# Patient Record
Sex: FEMALE | Race: WHITE | NOT HISPANIC OR LATINO
[De-identification: names, ages, dates, MRNs, and addresses within clinical notes are randomized per-mention and may not be internally consistent; named-entity substitution may affect disease eponyms.]

---

## 2017-01-23 ENCOUNTER — APPOINTMENT (OUTPATIENT)
Dept: ENDOCRINOLOGY | Facility: CLINIC | Age: 57
End: 2017-01-23

## 2019-01-18 ENCOUNTER — INPATIENT (INPATIENT)
Facility: HOSPITAL | Age: 59
LOS: 0 days | Discharge: HOME | DRG: 313 | End: 2019-01-19
Attending: HOSPITALIST | Admitting: HOSPITALIST
Payer: COMMERCIAL

## 2019-01-18 VITALS
HEIGHT: 62 IN | TEMPERATURE: 98 F | HEART RATE: 82 BPM | WEIGHT: 141.98 LBS | SYSTOLIC BLOOD PRESSURE: 131 MMHG | OXYGEN SATURATION: 100 % | RESPIRATION RATE: 18 BRPM | DIASTOLIC BLOOD PRESSURE: 78 MMHG

## 2019-01-18 DIAGNOSIS — R07.9 CHEST PAIN, UNSPECIFIED: ICD-10-CM

## 2019-01-18 LAB
ALBUMIN SERPL ELPH-MCNC: 4.4 G/DL — SIGNIFICANT CHANGE UP (ref 3.3–5)
ALP SERPL-CCNC: 78 U/L — SIGNIFICANT CHANGE UP (ref 40–120)
ALT FLD-CCNC: 22 U/L — SIGNIFICANT CHANGE UP (ref 10–45)
ANION GAP SERPL CALC-SCNC: 13 MMOL/L — SIGNIFICANT CHANGE UP (ref 5–17)
APTT BLD: 23.6 SEC — LOW (ref 27.5–36.3)
AST SERPL-CCNC: 25 U/L — SIGNIFICANT CHANGE UP (ref 10–40)
BASOPHILS # BLD AUTO: 0.1 K/UL — SIGNIFICANT CHANGE UP (ref 0–0.2)
BASOPHILS NFR BLD AUTO: 0.7 % — SIGNIFICANT CHANGE UP (ref 0–2)
BILIRUB SERPL-MCNC: 0.2 MG/DL — SIGNIFICANT CHANGE UP (ref 0.2–1.2)
BUN SERPL-MCNC: 21 MG/DL — SIGNIFICANT CHANGE UP (ref 7–23)
CALCIUM SERPL-MCNC: 9.6 MG/DL — SIGNIFICANT CHANGE UP (ref 8.4–10.5)
CHLORIDE SERPL-SCNC: 104 MMOL/L — SIGNIFICANT CHANGE UP (ref 96–108)
CO2 SERPL-SCNC: 24 MMOL/L — SIGNIFICANT CHANGE UP (ref 22–31)
CREAT SERPL-MCNC: 0.98 MG/DL — SIGNIFICANT CHANGE UP (ref 0.5–1.3)
EOSINOPHIL # BLD AUTO: 0.2 K/UL — SIGNIFICANT CHANGE UP (ref 0–0.5)
EOSINOPHIL NFR BLD AUTO: 2.3 % — SIGNIFICANT CHANGE UP (ref 0–6)
GLUCOSE SERPL-MCNC: 98 MG/DL — SIGNIFICANT CHANGE UP (ref 70–99)
HCT VFR BLD CALC: 39.4 % — SIGNIFICANT CHANGE UP (ref 34.5–45)
HGB BLD-MCNC: 13.3 G/DL — SIGNIFICANT CHANGE UP (ref 11.5–15.5)
INR BLD: 0.98 RATIO — SIGNIFICANT CHANGE UP (ref 0.88–1.16)
LYMPHOCYTES # BLD AUTO: 3.4 K/UL — HIGH (ref 1–3.3)
LYMPHOCYTES # BLD AUTO: 34.1 % — SIGNIFICANT CHANGE UP (ref 13–44)
MCHC RBC-ENTMCNC: 29.8 PG — SIGNIFICANT CHANGE UP (ref 27–34)
MCHC RBC-ENTMCNC: 33.7 GM/DL — SIGNIFICANT CHANGE UP (ref 32–36)
MCV RBC AUTO: 88.4 FL — SIGNIFICANT CHANGE UP (ref 80–100)
MONOCYTES # BLD AUTO: 0.8 K/UL — SIGNIFICANT CHANGE UP (ref 0–0.9)
MONOCYTES NFR BLD AUTO: 7.7 % — SIGNIFICANT CHANGE UP (ref 2–14)
NEUTROPHILS # BLD AUTO: 5.5 K/UL — SIGNIFICANT CHANGE UP (ref 1.8–7.4)
NEUTROPHILS NFR BLD AUTO: 55.2 % — SIGNIFICANT CHANGE UP (ref 43–77)
PLATELET # BLD AUTO: 326 K/UL — SIGNIFICANT CHANGE UP (ref 150–400)
POTASSIUM SERPL-MCNC: 4.7 MMOL/L — SIGNIFICANT CHANGE UP (ref 3.5–5.3)
POTASSIUM SERPL-SCNC: 4.7 MMOL/L — SIGNIFICANT CHANGE UP (ref 3.5–5.3)
PROT SERPL-MCNC: 7.2 G/DL — SIGNIFICANT CHANGE UP (ref 6–8.3)
PROTHROM AB SERPL-ACNC: 11.3 SEC — SIGNIFICANT CHANGE UP (ref 10–12.9)
RBC # BLD: 4.45 M/UL — SIGNIFICANT CHANGE UP (ref 3.8–5.2)
RBC # FLD: 12.1 % — SIGNIFICANT CHANGE UP (ref 10.3–14.5)
SODIUM SERPL-SCNC: 141 MMOL/L — SIGNIFICANT CHANGE UP (ref 135–145)
TROPONIN T, HIGH SENSITIVITY RESULT: <6 NG/L — SIGNIFICANT CHANGE UP (ref 0–51)
TROPONIN T, HIGH SENSITIVITY RESULT: <6 NG/L — SIGNIFICANT CHANGE UP (ref 0–51)
WBC # BLD: 9.9 K/UL — SIGNIFICANT CHANGE UP (ref 3.8–10.5)
WBC # FLD AUTO: 9.9 K/UL — SIGNIFICANT CHANGE UP (ref 3.8–10.5)

## 2019-01-18 PROCEDURE — 93010 ELECTROCARDIOGRAM REPORT: CPT

## 2019-01-18 PROCEDURE — 99223 1ST HOSP IP/OBS HIGH 75: CPT

## 2019-01-18 PROCEDURE — 71046 X-RAY EXAM CHEST 2 VIEWS: CPT | Mod: 26

## 2019-01-18 PROCEDURE — 99284 EMERGENCY DEPT VISIT MOD MDM: CPT | Mod: 25

## 2019-01-18 RX ORDER — ASPIRIN/CALCIUM CARB/MAGNESIUM 324 MG
324 TABLET ORAL DAILY
Qty: 0 | Refills: 0 | Status: DISCONTINUED | OUTPATIENT
Start: 2019-01-18 | End: 2019-01-19

## 2019-01-18 NOTE — H&P ADULT - NSHPPHYSICALEXAM_GEN_ALL_CORE
Vital Signs Last 24 Hrs  T(C): 36.7 (18 Jan 2019 19:41), Max: 36.7 (18 Jan 2019 19:41)  T(F): 98 (18 Jan 2019 19:41), Max: 98 (18 Jan 2019 19:41)  HR: 82 (18 Jan 2019 19:41) (82 - 82)  BP: 131/78 (18 Jan 2019 19:41) (131/78 - 131/78)  BP(mean): --  RR: 18 (18 Jan 2019 19:41) (18 - 18)  SpO2: 100% (18 Jan 2019 19:41) (100% - 100%)    PHYSICAL EXAM:  GENERAL: NAD, well-groomed, well-developed  HEAD:  Atraumatic, Normocephalic, maxillary sinus tenderness to palpation   EYES: EOMI, PERRLA, conjunctiva and sclera clear  ENMT: No oropharyngeal exudates, erythema or lesions,  Moist mucous membranes, good dentition  NECK: Supple, no cervical lymphadenopathy, + JVD to angle of jaw  NERVOUS SYSTEM:  Alert & Oriented X3, CN II-XII intact, 5/5 BUE and BLE motor strength, full sensation to light touch   CHEST/LUNG: Clear to auscultation bilaterally; No rales, no  rhonchi, no wheezing  HEART: Regular rate and rhythm; No murmurs, rubs, or gallops  ABDOMEN: Soft, Nontender, Nondistended  EXTREMITIES:  2+ Peripheral Pulses, No clubbing, cyanosis, or edema  LYMPH: No lymphadenopathy noted  SKIN: No rashes or lesions

## 2019-01-18 NOTE — ED ADULT NURSE NOTE - OBJECTIVE STATEMENT
58 yr old female with no medical history came in with congestion and pressure with some palpitations today. on assessment a and o x 3 lungs clear abd soft non tender no swelling in extremities, no n/v/d no fevers. pt states she feels that her chest is heavy and feels like she cannot take a deep enough breath.

## 2019-01-18 NOTE — H&P ADULT - PROBLEM SELECTOR PLAN 1
Differential for chest pressure includes ACS vs CAD vs viral URI  Patient does not have history of htn, hLd, CKD, DM or other common risk factors for CAD, however she does describe classic chest pain and dyspnea concerning for potential ACS. In addition, she has elevated JVD on exam.   Patient endorses chest pressure and had negative troponin x 1.   Will repeat troponin, check A1C and lipid profile.  Check proBNP.   Will monitor on telemetry.  Obtain TTE to assess for structural heart disease.  Patient states she is unable to ambulate comfortably due to dyspnea and thus is unlikely able to tolerate an exercise stress test. Differential for chest pressure includes ACS vs CAD vs viral URI  Patient does not have history of htn, hLd, CKD, DM or other common risk factors for CAD, however she does describe classic chest pain and dyspnea concerning for potential ACS. In addition, she has elevated JVD on exam.   Patient endorses chest pressure and had negative troponin x 1.   Will repeat troponin, check A1C and lipid profile.  Check proBNP.   Will monitor on telemetry.  Obtain TTE to assess for structural heart disease.  Patient states she is unable to ambulate comfortably due to dyspnea and thus is unlikely able to tolerate an exercise stress test.  She has no tachycardia, hypoxia, tachypnea to suggest pulmonary embolism.

## 2019-01-18 NOTE — H&P ADULT - NSHPREVIEWOFSYSTEMS_GEN_ALL_CORE
REVIEW OF SYSTEMS  CONSTITUTIONAL: No fever, + chills, no fatigue  EYES: No eye pain, no vision changes  ENMT:  No difficulty hearing, no throat pain, +maxillary pressure  RESPIRATORY: + Minimal cough, no wheezing, no sputum production;  +shortness of breath  CARDIOVASCULAR: + chest pressure, + palpitations, + SCHMITT, no leg swelling  GASTROINTESTINAL: No abdominal pain, no nausea, no vomiting, no hematemesis, no diarrhea, no constipation  GENITOURINARY: No dysuria, no hematuria  NEUROLOGICAL: No headaches, no loss of strength, no numbness  SKIN: No itching, no rashes, no lesions   MUSCULOSKELETAL: No joint pain, no joint swelling; No muscle pain  HEME/LYMPH: No easy bruising, bleeding

## 2019-01-18 NOTE — H&P ADULT - NSHPSOCIALHISTORY_GEN_ALL_CORE
The patient lives with her  and son.  She used to smoke for 5 years (socially, pack over 3 days), and quit about 20 years ago.

## 2019-01-18 NOTE — ED PROVIDER NOTE - CARE PLAN
Principal Discharge DX:	Chest pain Principal Discharge DX:	Chest pain  Secondary Diagnosis:	Former smoker, stopped smoking in distant past  Secondary Diagnosis:	Chest pressure  Secondary Diagnosis:	Shortness of breath

## 2019-01-18 NOTE — ED PROVIDER NOTE - OBJECTIVE STATEMENT
59 y/o female former smoker with no other PMHx sent in by Urgent Care for constant chest pressure x2 days. Patient stated the pain feels like "someone sitting on [her] chest" with associated SOB, palpitations, and dizziness while at work. Patient stated she also feels like "its squeezing" occasionally. Patient had recent sinus infection which patient felt like her symptoms were secondary to pneumonia. Patient denied fever chills, headache, back pain, abdominal pain, N/V/D, SCHMITT, peripheral edema, orthopnea, PND. Patient has no primary relatives with MI <61 y/o and has never had stress test.

## 2019-01-18 NOTE — ED PROVIDER NOTE - ATTENDING CONTRIBUTION TO CARE
Attending MD Sandy.  Agree with above.  Pt is a 59 yo female with pmhx of tobacco use >20 yrs ago, no family hx of cardiac disease/sudden premature death from cardiac cause or unknown cause who presents to ED with complaint of chest pressure ‘like something’ is sitting on her chest, SOB and palpitations x several days.  She had a ‘sinus infection’ last week and attributed her sxs to this believing she might have PNA.  Denies sig cough.  Denies pleuritic nature to CP.  Pt is not in resp distress in ED.  Pt is not tachycardic.  Has no LE edema, no hormone supplements on board.  No hx of DVT/PE.  No identifiable PE risk factors including no recent travel/prolonged bed rest.  She is well appearing.  EKG c/w T wave inversions in V1V2 without reciprocal changes. Attending MD Sandy.  Agree with above.  Pt is a 59 yo female with pmhx of tobacco use >20 yrs ago, no family hx of cardiac disease/sudden premature death from cardiac cause or unknown cause who presents to ED with complaint of chest pressure ‘like something’ is sitting on her chest, SOB and palpitations x several days.  She had a ‘sinus infection’ last week and attributed her sxs to this believing she might have PNA.  Denies sig cough.  Denies pleuritic nature to CP.  Pt is not in resp distress in ED.  Pt is not tachycardic.  Has no LE edema, no hormone supplements on board.  No hx of DVT/PE.  No identifiable PE risk factors including no recent travel/prolonged bed rest.  She is well appearing.  EKG c/w T wave inversions in V1V2 without reciprocal changes.    Concerning HPI for chest pressure assoc with SOB, age >55, female gender, T wave inversions in V1, V2 with no old EKG for comparison.  Pt stable for admission for ACS eval.  Pt amenable to plan.

## 2019-01-18 NOTE — ED PROVIDER NOTE - CROS ED MUSC ALL NEG
Report from Roula Hanson CRNA, see anesthesia record. ABD remains soft and non-tender post procedure. Pt has no complaints at this time and tolerated the procedure well. negative...

## 2019-01-18 NOTE — H&P ADULT - PROBLEM SELECTOR PLAN 2
Patient endorses sinus congestion and has had tenderness on exam.  Due to persistent symptoms over last 2 weeks, will start short course of amoxicillin-clavulanate 875mg/125mg BID for suspected bacterial sinusitis.  Start ocean nasal spray.   Monitor for resolution of symptoms

## 2019-01-18 NOTE — H&P ADULT - HISTORY OF PRESENT ILLNESS
This patient is a 58yoF with PMH of smoking who presents to ED with complaint of chest pain.    In the ED T 98F, HR 82, /78, RR 18, SpO2 100%RA     CBC is unremarkable. CMP notable for SCr of 0.98, with estimated GFR of 64. Troponin T < 6.     xray- has bronchiolitis This patient is a 58yoF with PMH of smoking who presents to ED with complaint of chest pressure. She endorses having significant sinus congestion, with minimal cough and white sputum production. She visited urgent care and was prescribed amoxicillin and nasal spray. The patient has had persistent maxillary pressure, but also developed new onset chest pressure and tightness, which she describes as "having a 2-3lb necklace on." The patient states that she has felt SOB while walking around her home and dizziness. She denies any orthopnea or PND. She also denies nausea, vomiting, abdominal pain. She has had chills. She denies hx of CAD or any history of stress test. She had new onset of palpitations today which prompted her to visit urgent care, which had sent her to the hospital. Her family history is notable for a father who has atrial fibrillation and mitral valve replacement.     In the ED T 98F, HR 82, /78, RR 18, SpO2 100%RA   She was given aspirin 325mg

## 2019-01-18 NOTE — H&P ADULT - NSHPLABSRESULTS_GEN_ALL_CORE
Labs, imaging and EKG personally reviewed by me.     LABS:                        13.3   9.9   )-----------( 326      ( 18 Jan 2019 20:26 )             39.4     Hgb Trend: 13.3<--  01-18    141  |  104  |  21  ----------------------------<  98  4.7   |  24  |  0.98    Ca    9.6      18 Jan 2019 20:26    TPro  7.2  /  Alb  4.4  /  TBili  0.2  /  DBili  x   /  AST  25  /  ALT  22  /  AlkPhos  78  01-18    Creatinine Trend: 0.98<--  PT/INR - ( 18 Jan 2019 20:26 )   PT: 11.3 sec;   INR: 0.98 ratio       PTT - ( 18 Jan 2019 20:26 )  PTT:23.6 sec      EKG NSR HR 60s-70s, LAD, RBBB      < from: Xray Chest 2 Views PA/Lat (01.18.19 @ 20:50) >    EXAM:  XR CHEST PA LAT 2V                        PROCEDURE DATE:  01/18/2019      ******PRELIMINARY REPORT******      INTERPRETATION:  no emergent findings    < end of copied text >

## 2019-01-18 NOTE — H&P ADULT - FAMILY HISTORY
Father  Still living? Unknown  Family history of atrial fibrillation, Age at diagnosis: Age Unknown  Family history of mitral valve disorder, Age at diagnosis: Age Unknown

## 2019-01-18 NOTE — ED ADULT NURSE NOTE - NSIMPLEMENTINTERV_GEN_ALL_ED
Implemented All Universal Safety Interventions:  [place] to call system. Call bell, personal items and telephone within reach. Instruct patient to call for assistance. Room bathroom lighting operational. Non-slip footwear when patient is off stretcher. Physically safe environment: no spills, clutter or unnecessary equipment. Stretcher in lowest position, wheels locked, appropriate side rails in place.

## 2019-01-18 NOTE — H&P ADULT - ASSESSMENT
This patient is a 58yoF with PMH of smoking who presents to ED with complaint of chest pressure concerning for unstable angina.

## 2019-01-19 ENCOUNTER — TRANSCRIPTION ENCOUNTER (OUTPATIENT)
Age: 59
End: 2019-01-19

## 2019-01-19 VITALS
HEART RATE: 62 BPM | RESPIRATION RATE: 18 BRPM | TEMPERATURE: 98 F | SYSTOLIC BLOOD PRESSURE: 97 MMHG | DIASTOLIC BLOOD PRESSURE: 59 MMHG | OXYGEN SATURATION: 97 %

## 2019-01-19 DIAGNOSIS — R07.89 OTHER CHEST PAIN: ICD-10-CM

## 2019-01-19 DIAGNOSIS — Z29.9 ENCOUNTER FOR PROPHYLACTIC MEASURES, UNSPECIFIED: ICD-10-CM

## 2019-01-19 DIAGNOSIS — J34.89 OTHER SPECIFIED DISORDERS OF NOSE AND NASAL SINUSES: ICD-10-CM

## 2019-01-19 LAB
CHOLEST SERPL-MCNC: 209 MG/DL — HIGH (ref 10–199)
HBA1C BLD-MCNC: 4.4 % — SIGNIFICANT CHANGE UP (ref 4–5.6)
HDLC SERPL-MCNC: 86 MG/DL — SIGNIFICANT CHANGE UP
LIPID PNL WITH DIRECT LDL SERPL: 112 MG/DL — SIGNIFICANT CHANGE UP
NT-PROBNP SERPL-SCNC: 27 PG/ML — SIGNIFICANT CHANGE UP (ref 0–300)
TOTAL CHOLESTEROL/HDL RATIO MEASUREMENT: 2.4 RATIO — LOW (ref 3.3–7.1)
TRIGL SERPL-MCNC: 55 MG/DL — SIGNIFICANT CHANGE UP (ref 10–149)
TROPONIN T, HIGH SENSITIVITY RESULT: <6 NG/L — SIGNIFICANT CHANGE UP (ref 0–51)
TSH SERPL-MCNC: 1.3 UIU/ML — SIGNIFICANT CHANGE UP (ref 0.27–4.2)

## 2019-01-19 PROCEDURE — 83880 ASSAY OF NATRIURETIC PEPTIDE: CPT

## 2019-01-19 PROCEDURE — 80061 LIPID PANEL: CPT

## 2019-01-19 PROCEDURE — 71046 X-RAY EXAM CHEST 2 VIEWS: CPT

## 2019-01-19 PROCEDURE — 85027 COMPLETE CBC AUTOMATED: CPT

## 2019-01-19 PROCEDURE — 99285 EMERGENCY DEPT VISIT HI MDM: CPT

## 2019-01-19 PROCEDURE — 93306 TTE W/DOPPLER COMPLETE: CPT | Mod: 26

## 2019-01-19 PROCEDURE — 93306 TTE W/DOPPLER COMPLETE: CPT

## 2019-01-19 PROCEDURE — 93005 ELECTROCARDIOGRAM TRACING: CPT

## 2019-01-19 PROCEDURE — 80053 COMPREHEN METABOLIC PANEL: CPT

## 2019-01-19 PROCEDURE — 83036 HEMOGLOBIN GLYCOSYLATED A1C: CPT

## 2019-01-19 PROCEDURE — 99239 HOSP IP/OBS DSCHRG MGMT >30: CPT

## 2019-01-19 PROCEDURE — 84443 ASSAY THYROID STIM HORMONE: CPT

## 2019-01-19 PROCEDURE — 84484 ASSAY OF TROPONIN QUANT: CPT

## 2019-01-19 PROCEDURE — 85730 THROMBOPLASTIN TIME PARTIAL: CPT

## 2019-01-19 PROCEDURE — 85610 PROTHROMBIN TIME: CPT

## 2019-01-19 RX ORDER — SODIUM CHLORIDE 0.65 %
1 AEROSOL, SPRAY (ML) NASAL THREE TIMES A DAY
Qty: 0 | Refills: 0 | Status: DISCONTINUED | OUTPATIENT
Start: 2019-01-19 | End: 2019-01-19

## 2019-01-19 RX ORDER — ENOXAPARIN SODIUM 100 MG/ML
40 INJECTION SUBCUTANEOUS EVERY 24 HOURS
Qty: 0 | Refills: 0 | Status: DISCONTINUED | OUTPATIENT
Start: 2019-01-19 | End: 2019-01-19

## 2019-01-19 RX ORDER — ALBUTEROL 90 UG/1
2 AEROSOL, METERED ORAL
Qty: 1 | Refills: 0 | OUTPATIENT
Start: 2019-01-19 | End: 2019-02-17

## 2019-01-19 RX ORDER — PSEUDOEPHEDRINE HCL 30 MG
1 TABLET ORAL
Qty: 0 | Refills: 0 | COMMUNITY

## 2019-01-19 RX ORDER — ACETAMINOPHEN 500 MG
650 TABLET ORAL ONCE
Qty: 0 | Refills: 0 | Status: COMPLETED | OUTPATIENT
Start: 2019-01-19 | End: 2019-01-19

## 2019-01-19 RX ORDER — ASPIRIN/CALCIUM CARB/MAGNESIUM 324 MG
81 TABLET ORAL DAILY
Qty: 0 | Refills: 0 | Status: DISCONTINUED | OUTPATIENT
Start: 2019-01-19 | End: 2019-01-19

## 2019-01-19 RX ORDER — IPRATROPIUM/ALBUTEROL SULFATE 18-103MCG
3 AEROSOL WITH ADAPTER (GRAM) INHALATION ONCE
Qty: 0 | Refills: 0 | Status: DISCONTINUED | OUTPATIENT
Start: 2019-01-19 | End: 2019-01-19

## 2019-01-19 RX ADMIN — Medication 650 MILLIGRAM(S): at 09:19

## 2019-01-19 RX ADMIN — Medication 1 SPRAY(S): at 13:23

## 2019-01-19 RX ADMIN — Medication 650 MILLIGRAM(S): at 09:45

## 2019-01-19 RX ADMIN — Medication 1 SPRAY(S): at 05:20

## 2019-01-19 RX ADMIN — Medication 81 MILLIGRAM(S): at 12:15

## 2019-01-19 NOTE — DISCHARGE NOTE ADULT - CARE PLAN
Principal Discharge DX:	Chest pressure  Goal:	resolved  Assessment and plan of treatment:	troponinx3 negative  no events on telemetry  Secondary Diagnosis:	Sinusitis  Assessment and plan of treatment:	complete course of antibiotics  albuterol prn  sudafed prn

## 2019-01-19 NOTE — DISCHARGE NOTE ADULT - PLAN OF CARE
resolved troponinx3 negative  no events on telemetry complete course of antibiotics  albuterol prn  sudafed prn

## 2019-01-19 NOTE — PROVIDER CONTACT NOTE (MEDICATION) - ASSESSMENT
pt refusing augmentin & lovenox. pt states she was ordered augmentin outpatient for some cold-like symptoms like runny nose. she never took the medication and felt better & would not like to start the medication now

## 2019-01-19 NOTE — DISCHARGE NOTE ADULT - HOSPITAL COURSE
As per H&P-- 58yoF with PMH of smoking who presents to ED with complaint of chest pressure. She endorses having significant sinus congestion, with minimal cough and white sputum production. She visited urgent care and was prescribed amoxicillin and nasal spray. The patient has had persistent maxillary pressure, but also developed new onset chest pressure and tightness, which she describes as "having a 2-3lb necklace on." The patient states that she has felt SOB while walking around her home and dizziness. She denies any orthopnea or PND. She also denies nausea, vomiting, abdominal pain. She has had chills. She denies hx of CAD or any history of stress test. She had new onset of palpitations today which prompted her to visit urgent care, which had sent her to the hospital. Her family history is notable for a father who has atrial fibrillation and mitral valve replacement.     In the ED T 98F, HR 82, /78, RR 18, SpO2 100%RA   She was given aspirin 325mg    A/W chest pressure concerning for unstable angina.   Likely ACS vs CAD vs viral URI   troponin x 3 neg.  proBNP neg  no events on telemetry.  NO C/O of tachycardia, hypoxia, tachypnea to suggest pulmonary embolism.Pt c/o Sinus pressure and sinus congestion  Due to persistent symptoms over last 2 weeks, will continue short course of amoxicillin-clavulanate 875mg/125mg BID for suspected bacterial sinusitis. Vital signs are stable. Pt is seen by medicine attending today, cleared for discharge home

## 2019-01-19 NOTE — DISCHARGE NOTE ADULT - PATIENT PORTAL LINK FT
You can access the [facility] Patient Portal, offered by , by registering with the following website: [email or web address]

## 2019-01-19 NOTE — DISCHARGE NOTE ADULT - MEDICATION SUMMARY - MEDICATIONS TO TAKE
I will START or STAY ON the medications listed below when I get home from the hospital:    albuterol 90 mcg/inh inhalation aerosol  -- 2 puff(s) inhaled 4 times a day MDD:8 puffs prn  -- For inhalation only.  It is very important that you take or use this exactly as directed.  Do not skip doses or discontinue unless directed by your doctor.  Obtain medical advice before taking any non-prescription drugs as some may affect the action of this medication.  Shake well before use.    -- Indication: For Sinus pressure    Sudafed 12-Hour 120 mg oral tablet, extended release  -- 1 tab(s) by mouth every 12 hours prn for cold and congestion  -- Indication: For Congestion    amoxicillin-clavulanate 875 mg-125 mg oral tablet  -- 1 tab(s) by mouth 2 times a day to complete the course as prescribed  -- Indication: For Sinusitis

## 2019-01-24 PROBLEM — Z87.891 PERSONAL HISTORY OF NICOTINE DEPENDENCE: Chronic | Status: ACTIVE | Noted: 2019-01-18

## 2019-02-19 NOTE — ED PROVIDER NOTE - CARDIAC, MLM
Normal rate, regular rhythm.  Heart sounds S1, S2.  No murmurs, rubs or gallops. negative detailed exam

## 2019-03-11 ENCOUNTER — APPOINTMENT (OUTPATIENT)
Dept: CARDIOLOGY | Facility: CLINIC | Age: 59
End: 2019-03-11

## 2019-03-25 NOTE — DISCHARGE NOTE ADULT - FUNCTIONAL SCREEN CURRENT LEVEL: AMBULATION, MLM
PHYSICIAN NEXT STEPS:  Call the Patient    CHIEF COMPLAINT:  Chief Complaint/Protocol Used: Cuts And Lacerations  Onset: 1 week ago      ASSESSMENT:  ? Onset: 1 week ago  ? Appearance Of Injury: 2-3 inches, deep  ? Size: 2-3 inches  ? Bleeding: no  ? Location: left foot on top  ? When: unsure  ? Mechanism: unsure  ? Tetanus: 2011  -------------------------------------------------------    DISPOSITION:  Disposition Recommendation: Go to ED Now  Questions that led to disposition:  ? Skin is split open or gaping (if unsure, refer in if cut length > 1/4 inch or 6 mm on the face; length > 1/2 inch or 12 mm elsewhere)  Patient Directed To: Unspecified  Patient Intended Action: Go to Urgent Care Center      CALL NOTES:  03/24/2019 at 9:01 PM by Shirin Marks  ? Mother declined ED dispostion and is going to [facility]. Dr. Scales paged at 901pm    ? foot pain, limping,could barely walk, on top of foot big open wound deep,     DISPOSITION OVERRIDE/PROVIDER CONSULT:  Disposition Override: N/A  Override Source: Unspecified  Consulted with PCP: No  Consulted with On-Call Physician: No  Second Level Triage Override Reason: Sent to ED to avoid delay of care      CALLER CONTACT INFO:  Name: Jayleen Ballesteros (Self)  Phone 1: [phone] (Work Phone)  Phone 2: [phone] (Mobile) - Preferred  Phone 3: [phone] (Work Phone)      ENCOUNTER STARTED:  03/24/19 08:51:06 PM  ENCOUNTER ASSIGNED TO/CLOSED BY:  Shirin Marks @ 03/24/19 09:01:25 PM      -------------------------------------------------------    CARE ADVICE given per Cuts And Lacerations guideline.  GO TO ED NOW: Your child needs to be seen in the Emergency Department immediately. Go to the ER at _________[facility] Hospital. Leave now. Drive carefully.; CLEAN THE LACERATION:   * Wash the wound briefly with soap and water before being seen.  * Caution: Never soak a wound that might need sutures, because it may become more swollen and difficult to close.  * For any dirt, scrub  gently with a washcloth.  * For any bleeding, apply direct pressure with a sterile gauze for 10 minutes.  * Cover with a sterile gauze or Band-Aid until seen.; PAIN MEDICINE:   * For pain relief, give acetaminophen every 4 hours OR ibuprofen every 6 hours as needed. (See Dosage table.); CARE ADVICE given per Cuts and Lacerations (Pediatric) guideline.      UNDERSTANDS CARE ADVICE: Yes    AGREES WITH CARE ADVICE: Yes    WILL FOLLOW CARE ADVICE: Yes    -------------------------------------------------------   0 = independent

## 2019-06-10 ENCOUNTER — NON-APPOINTMENT (OUTPATIENT)
Age: 59
End: 2019-06-10

## 2019-06-10 ENCOUNTER — APPOINTMENT (OUTPATIENT)
Dept: CARDIOLOGY | Facility: CLINIC | Age: 59
End: 2019-06-10
Payer: MEDICARE

## 2019-06-10 VITALS
DIASTOLIC BLOOD PRESSURE: 70 MMHG | BODY MASS INDEX: 27.77 KG/M2 | OXYGEN SATURATION: 97 % | WEIGHT: 149 LBS | SYSTOLIC BLOOD PRESSURE: 108 MMHG | HEIGHT: 61.5 IN | HEART RATE: 60 BPM

## 2019-06-10 DIAGNOSIS — Z00.00 ENCOUNTER FOR GENERAL ADULT MEDICAL EXAMINATION W/OUT ABNORMAL FINDINGS: ICD-10-CM

## 2019-06-10 DIAGNOSIS — Z86.69 PERSONAL HISTORY OF OTHER DISEASES OF THE NERVOUS SYSTEM AND SENSE ORGANS: ICD-10-CM

## 2019-06-10 DIAGNOSIS — Z82.49 FAMILY HISTORY OF ISCHEMIC HEART DISEASE AND OTHER DISEASES OF THE CIRCULATORY SYSTEM: ICD-10-CM

## 2019-06-10 DIAGNOSIS — Z87.891 PERSONAL HISTORY OF NICOTINE DEPENDENCE: ICD-10-CM

## 2019-06-10 PROCEDURE — 99205 OFFICE O/P NEW HI 60 MIN: CPT

## 2019-06-10 PROCEDURE — 93000 ELECTROCARDIOGRAM COMPLETE: CPT

## 2019-06-10 PROCEDURE — 36415 COLL VENOUS BLD VENIPUNCTURE: CPT

## 2019-06-10 RX ORDER — ALBUTEROL SULFATE 90 UG/1
108 (90 BASE) AEROSOL, METERED RESPIRATORY (INHALATION)
Qty: 9 | Refills: 0 | Status: COMPLETED | COMMUNITY
Start: 2019-01-19

## 2019-06-10 RX ORDER — ONDANSETRON 8 MG/1
8 TABLET ORAL
Qty: 10 | Refills: 0 | Status: COMPLETED | COMMUNITY
Start: 2019-04-02

## 2019-06-10 RX ORDER — OXYCODONE AND ACETAMINOPHEN 10; 325 MG/1; MG/1
10-325 TABLET ORAL
Qty: 6 | Refills: 0 | Status: COMPLETED | COMMUNITY
Start: 2019-05-30

## 2019-06-10 RX ORDER — AMOXICILLIN AND CLAVULANATE POTASSIUM 875; 125 MG/1; MG/1
875-125 TABLET, COATED ORAL
Qty: 14 | Refills: 0 | Status: COMPLETED | COMMUNITY
Start: 2019-01-11

## 2019-06-10 RX ORDER — PREDNISONE 20 MG/1
20 TABLET ORAL
Qty: 4 | Refills: 0 | Status: COMPLETED | COMMUNITY
Start: 2019-04-04

## 2019-06-10 RX ORDER — FLUTICASONE PROPIONATE 50 UG/1
50 SPRAY, METERED NASAL
Qty: 16 | Refills: 0 | Status: COMPLETED | COMMUNITY
Start: 2019-01-11

## 2019-06-10 RX ORDER — BUTALBITAL, ASPIRIN, AND CAFFEINE 325; 50; 40 MG/1; MG/1; MG/1
50-325-40 CAPSULE ORAL
Refills: 0 | Status: ACTIVE | COMMUNITY
Start: 2019-05-30

## 2019-06-10 RX ORDER — SUMATRIPTAN 50 MG/1
50 TABLET, FILM COATED ORAL
Qty: 9 | Refills: 0 | Status: COMPLETED | COMMUNITY
Start: 2019-05-30

## 2019-06-10 RX ORDER — AZELASTINE HYDROCHLORIDE 137 UG/1
137 SPRAY, METERED NASAL
Qty: 30 | Refills: 0 | Status: COMPLETED | COMMUNITY
Start: 2019-01-11

## 2019-06-11 NOTE — DISCUSSION/SUMMARY
[FreeTextEntry1] : Atypical CP - given recent negative w/u and paucity of risk factors for CAD, I am not inclined to pursue further cardiac w/u at this time.\par \par Carotid Bruit - she will return for a Carotid Doppler in early July.\par \par Will check CBC, CMP and lipid panel.

## 2019-06-11 NOTE — HISTORY OF PRESENT ILLNESS
[FreeTextEntry1] : She is 58 years of age. She was seen at [facility] in January for chest pressure; she described a pressure as if "she had a 2 or 3 pound necklace" on. She also experienced exertional dyspnea when walking at home and a sense of the dizziness. At the same time, she described significant sinus congestion with production of white sputum and minimal cough. She had been prescribed amoxicillin and nasal spray at an urgent care center.\par \par EKG demonstrated fLAD, but no other abnormality. Serial high-sensitivity troponin determinations were negative; myocardial infarction was ruled out. Echocardiography done on January 19 showed normal LV systolic function normal valve function and increased relative wall thickness with normal LV mass index, suggestive of concentric LVH. Chest x-ray showed clear lungs and normal heart size.\par \par At discharge, no evidence of active cardiac pathology was seen. She was sent home with a diagnosis of suspected bacterial sinusitis to complete a course of amoxicillin with clavulanic acid.\par \par As she presents today, she describes occasional episodes of localized left chest discomfort, unlike the symptoms that prompted her admission. This tends to occur with stress but is not associated with exertion. She remains physically active, and describes no exertional chest discomfort or dyspnea. She reports no palpitations. There have been no episodes of lightheadedness or loss of consciousness. \par \par She reports that she was once told or heart murmur many years ago, but not since. She has a remote history of casual cigarette use, one pack over 3 days for approximately 5 years, but stopped cigarette use at least 25 years ago. There is no family history of premature coronary artery disease. She reports no history of hypertension, diabetes or cholesterol elevation.

## 2019-06-11 NOTE — PHYSICAL EXAM
[General Appearance - Well Developed] : well developed [Normal Appearance] : normal appearance [Well Groomed] : well groomed [General Appearance - Well Nourished] : well nourished [No Deformities] : no deformities [General Appearance - In No Acute Distress] : no acute distress [Normal Conjunctiva] : the conjunctiva exhibited no abnormalities [Eyelids - No Xanthelasma] : the eyelids demonstrated no xanthelasmas [Normal Jugular Venous A Waves Present] : normal jugular venous A waves present [Normal Jugular Venous V Waves Present] : normal jugular venous V waves present [No Jugular Venous Cadet A Waves] : no jugular venous cadet A waves [Heart Rate And Rhythm] : heart rate and rhythm were normal [Respiration, Rhythm And Depth] : normal respiratory rhythm and effort [Auscultation Breath Sounds / Voice Sounds] : lungs were clear to auscultation bilaterally [Bowel Sounds] : normal bowel sounds [Abnormal Walk] : normal gait [Gait - Sufficient For Exercise Testing] : the gait was sufficient for exercise testing [Nail Clubbing] : no clubbing of the fingernails [Cyanosis, Localized] : no localized cyanosis [Skin Color & Pigmentation] : normal skin color and pigmentation [] : no rash [No Venous Stasis] : no venous stasis [Oriented To Time, Place, And Person] : oriented to person, place, and time [Affect] : the affect was normal [Mood] : the mood was normal [FreeTextEntry1] : No bruit.  Soft, non-tender.  Liver and spleen not palpable; aortic pulsation not palpable.

## 2019-06-12 LAB
ALBUMIN SERPL ELPH-MCNC: 4.7 G/DL
ALP BLD-CCNC: 82 U/L
ALT SERPL-CCNC: 12 U/L
ANION GAP SERPL CALC-SCNC: 12 MMOL/L
AST SERPL-CCNC: 14 U/L
BASOPHILS # BLD AUTO: 0.03 K/UL
BASOPHILS NFR BLD AUTO: 0.3 %
BILIRUB SERPL-MCNC: 0.2 MG/DL
BUN SERPL-MCNC: 16 MG/DL
CALCIUM SERPL-MCNC: 9.8 MG/DL
CHLORIDE SERPL-SCNC: 105 MMOL/L
CHOLEST SERPL-MCNC: 201 MG/DL
CHOLEST/HDLC SERPL: 2.3 RATIO
CO2 SERPL-SCNC: 27 MMOL/L
CREAT SERPL-MCNC: 0.83 MG/DL
EOSINOPHIL # BLD AUTO: 0.15 K/UL
EOSINOPHIL NFR BLD AUTO: 1.6 %
GLUCOSE SERPL-MCNC: 90 MG/DL
HCT VFR BLD CALC: 43.5 %
HDLC SERPL-MCNC: 86 MG/DL
HGB BLD-MCNC: 13.5 G/DL
IMM GRANULOCYTES NFR BLD AUTO: 0.4 %
LDLC SERPL CALC-MCNC: 103 MG/DL
LDLC SERPL DIRECT ASSAY-MCNC: 115 MG/DL
LYMPHOCYTES # BLD AUTO: 3.36 K/UL
LYMPHOCYTES NFR BLD AUTO: 35.8 %
MAN DIFF?: NORMAL
MCHC RBC-ENTMCNC: 28.8 PG
MCHC RBC-ENTMCNC: 31 GM/DL
MCV RBC AUTO: 92.9 FL
MONOCYTES # BLD AUTO: 0.81 K/UL
MONOCYTES NFR BLD AUTO: 8.6 %
NEUTROPHILS # BLD AUTO: 4.99 K/UL
NEUTROPHILS NFR BLD AUTO: 53.3 %
PLATELET # BLD AUTO: 308 K/UL
POTASSIUM SERPL-SCNC: 4.6 MMOL/L
PROT SERPL-MCNC: 7.3 G/DL
RBC # BLD: 4.68 M/UL
RBC # FLD: 12.9 %
SODIUM SERPL-SCNC: 144 MMOL/L
TRIGL SERPL-MCNC: 61 MG/DL
WBC # FLD AUTO: 9.38 K/UL

## 2019-07-03 ENCOUNTER — APPOINTMENT (OUTPATIENT)
Dept: CARDIOLOGY | Facility: CLINIC | Age: 59
End: 2019-07-03
Payer: MEDICARE

## 2019-07-03 PROCEDURE — 93880 EXTRACRANIAL BILAT STUDY: CPT

## 2019-09-23 ENCOUNTER — APPOINTMENT (OUTPATIENT)
Dept: CARDIOLOGY | Facility: CLINIC | Age: 59
End: 2019-09-23
Payer: MEDICARE

## 2019-09-23 ENCOUNTER — NON-APPOINTMENT (OUTPATIENT)
Age: 59
End: 2019-09-23

## 2019-09-23 VITALS
RESPIRATION RATE: 17 BRPM | HEART RATE: 65 BPM | TEMPERATURE: 98.1 F | SYSTOLIC BLOOD PRESSURE: 107 MMHG | BODY MASS INDEX: 28.52 KG/M2 | OXYGEN SATURATION: 95 % | HEIGHT: 61.5 IN | WEIGHT: 153 LBS | DIASTOLIC BLOOD PRESSURE: 72 MMHG

## 2019-09-23 PROCEDURE — 99214 OFFICE O/P EST MOD 30 MIN: CPT

## 2019-09-23 PROCEDURE — 93000 ELECTROCARDIOGRAM COMPLETE: CPT

## 2019-09-23 NOTE — DISCUSSION/SUMMARY
[FreeTextEntry1] : \par Atypical CP - no recurrence.  \par \par Carotid Bruit - no obstructive dz. seen; only minimal intimal thickening described.\par \par No additional cardiac testing at this time; she will return in 4 months.

## 2019-09-23 NOTE — ASSESSMENT
[FreeTextEntry1] : Atypical CP\par \par Carotid Bruit\par \par LAD on EKG\par \par Increased LV wall mass on TTE Jan. 2019

## 2019-09-23 NOTE — HISTORY OF PRESENT ILLNESS
[FreeTextEntry1] : She was seen at [facility] in January for chest pressure, as if "she had a 2 or 3 pound necklace" on. EKG demonstrated LAD, but no other abnormality. Serial high-sensitivity troponin determinations were negative. Echocardiography on January 19 showed normal LV systolic function normal valve function and increased relative wall thickness with normal LV mass index, suggestive of concentric LVH. Chest x-ray showed clear lungs and normal heart size.\par \par At discharge, no evidence of active cardiac pathology was seen. Recent carotid Doppler showed only mild intimal thickening.\par \par As she returns today, she remains well.  She continues her usual activities without problem, and describes no episodes of chest discomfort dyspnea. She reports no palpitations. There have been no episodes of lightheadedness or loss of consciousness.

## 2019-09-23 NOTE — PHYSICAL EXAM
[General Appearance - Well Developed] : well developed [Well Groomed] : well groomed [Normal Appearance] : normal appearance [No Deformities] : no deformities [General Appearance - Well Nourished] : well nourished [General Appearance - In No Acute Distress] : no acute distress [Normal Conjunctiva] : the conjunctiva exhibited no abnormalities [Eyelids - No Xanthelasma] : the eyelids demonstrated no xanthelasmas [Normal Jugular Venous A Waves Present] : normal jugular venous A waves present [Normal Jugular Venous V Waves Present] : normal jugular venous V waves present [No Jugular Venous Cadet A Waves] : no jugular venous cadet A waves [Respiration, Rhythm And Depth] : normal respiratory rhythm and effort [Auscultation Breath Sounds / Voice Sounds] : lungs were clear to auscultation bilaterally [Heart Rate And Rhythm] : heart rate and rhythm were normal [Bowel Sounds] : normal bowel sounds [Abnormal Walk] : normal gait [Gait - Sufficient For Exercise Testing] : the gait was sufficient for exercise testing [Nail Clubbing] : no clubbing of the fingernails [Skin Color & Pigmentation] : normal skin color and pigmentation [Cyanosis, Localized] : no localized cyanosis [] : no rash [No Venous Stasis] : no venous stasis [Oriented To Time, Place, And Person] : oriented to person, place, and time [Affect] : the affect was normal [Mood] : the mood was normal [FreeTextEntry1] : No bruit.  Soft, non-tender.  Liver and spleen not palpable; aortic pulsation not palpable.

## 2019-11-11 ENCOUNTER — APPOINTMENT (OUTPATIENT)
Dept: PAIN MANAGEMENT | Facility: CLINIC | Age: 59
End: 2019-11-11
Payer: MEDICARE

## 2019-11-11 VITALS
BODY MASS INDEX: 27.4 KG/M2 | HEART RATE: 71 BPM | WEIGHT: 147 LBS | DIASTOLIC BLOOD PRESSURE: 67 MMHG | HEIGHT: 61.5 IN | SYSTOLIC BLOOD PRESSURE: 118 MMHG

## 2019-11-11 DIAGNOSIS — Z82.0 FAMILY HISTORY OF EPILEPSY AND OTHER DISEASES OF THE NERVOUS SYSTEM: ICD-10-CM

## 2019-11-11 DIAGNOSIS — R51 HEADACHE: ICD-10-CM

## 2019-11-11 PROCEDURE — 99204 OFFICE O/P NEW MOD 45 MIN: CPT

## 2019-11-11 NOTE — HISTORY OF PRESENT ILLNESS
[Chronic Headache] : chronic headache [Nausea] : nausea [Photophobia] : photophobia [Phonophobia] : phonophobia [Scalp Tenderness] : scalp tenderness [> 15 days per month] : > 15 days per month [> 4 hours] : > 4 hours [stayed the same] : stayed the same [5] : a minimum pain level of 5/10 [9] : a maximum pain level of 9/10 [Worsened] : The patient reports ~his/her~ symptoms since the last visit have worsened [FreeTextEntry1] : Pt is 60 yo woman who would  get intense headaches over her eye on one side even when 8 years old.  WOuld also get headache prior to cycle on regular basis.\desmond Had first severe headache with nausea and vomiting in her 300s and 40s.  have increased since she has gone through menopause.\desmond Now has headaches more often than not and has been using butalbital.  Tries to keep it limited but tries to decide if she "is going to be cloudy" or use the medications too often.\desmond Uses 3 butalbital 3 tabs at once and occasionally 2*/ day.  Does get significant recurrence.  Can tell the rain or change in temperature.  \par Headaches "more often than not" for 5 years.  \par + p/p phobia, nausea and vomiting.\desmond Does notice some blurriness with vision.\desmond

## 2019-11-11 NOTE — ASSESSMENT
[FreeTextEntry1] : Pt with probable chronic migraine exacerbated by rebound.  \par Would give tiral of nortriptyline preventively along with general headache hygiene and migraine information\par trial of prn rizatriptan\par taper off of butalbital (may need to consider phenobarb taper)\par consider use of cgrp antibody\par consider use of nerivio migra.

## 2019-11-11 NOTE — PHYSICAL EXAM
[General Appearance - Alert] : alert [General Appearance - Well Nourished] : well nourished [General Appearance - Well Developed] : well developed [General Appearance - Well-Appearing] : healthy appearing [Oriented To Time, Place, And Person] : oriented to person, place, and time [Memory Recent] : recent memory was not impaired [Impaired Insight] : insight and judgment were intact [Memory Remote] : remote memory was not impaired [Person] : oriented to person [Place] : oriented to place [Time] : oriented to time [Short Term Intact] : short term memory intact [Remote Intact] : remote memory intact [Registration Intact] : recent registration memory intact [Concentration Intact] : normal concentrating ability [Writing A Sentence] : no difficulty writing a sentence [Naming Objects] : no difficulty naming common objects [Visual Intact] : visual attention was ~T not ~L decreased [Fluency] : fluency intact [Comprehension] : comprehension intact [Reading] : reading intact [Past History] : adequate knowledge of personal past history [Current Events] : adequate knowledge of current events [Vocabulary] : adequate range of vocabulary [Cranial Nerves Oculomotor (III)] : extraocular motion intact [Cranial Nerves Trigeminal (V)] : facial sensation intact symmetrically [Cranial Nerves Accessory (XI - Cranial And Spinal)] : head turning and shoulder shrug symmetric [Cranial Nerves Vestibulocochlear (VIII)] : hearing was intact bilaterally [Cranial Nerves Facial (VII)] : face symmetrical [Cranial Nerves Hypoglossal (XII)] : there was no tongue deviation with protrusion [Motor Strength] : muscle strength was normal in all four extremities [Motor Handedness Right-Handed] : the patient is right hand dominant [No Muscle Atrophy] : normal bulk in all four extremities [Sensation Tactile Decrease] : light touch was intact [Motor Strength Lower Extremities Bilaterally] : strength was normal in both lower extremities [Motor Strength Upper Extremities Bilaterally] : strength was normal in both upper extremities [Allodynia] : no ~T allodynia present [Past-pointing] : there was no past-pointing [Limited Balance] : balance was intact [Balance] : balance was intact [Tremor] : no tremor present [Dysdiadochokinesia Bilaterally] : not present [Coordination - Dysmetria Impaired Finger-to-Nose Bilateral] : not present [2+] : Patella right 2+ [Sclera] : the sclera and conjunctiva were normal [PERRL With Normal Accommodation] : pupils were equal in size, round, reactive to light, with normal accommodation [Outer Ear] : the ears and nose were normal in appearance [Extraocular Movements] : extraocular movements were intact [Neck Cervical Mass (___cm)] : no neck mass was observed [Hearing Threshold Finger Rub Not [place]] : hearing was normal [FreeTextEntry1] : moderate bilateral paraspinal limitations [Edema] : there was no peripheral edema [Exaggerated Use Of Accessory Muscles For Inspiration] : no accessory muscle use [Nail Clubbing] : no clubbing  or cyanosis of the fingernails [Abnormal Walk] : normal gait [Musculoskeletal - Swelling] : no joint swelling seen [Motor Tone] : muscle strength and tone were normal [Involuntary Movements] : no involuntary movements were seen [Skin Turgor] : normal skin turgor [Skin Color & Pigmentation] : normal skin color and pigmentation [] : no rash

## 2019-11-11 NOTE — REVIEW OF SYSTEMS
[Fever] : no fever [Chills] : no chills [Feeling Poorly] : feeling poorly [Feeling Tired] : feeling tired [Recent Weight Gain (___ Lbs)] : recent [unfilled] ~Ulb weight gain [Eyesight Problems] : no eyesight problems [Eye Pain] : eye pain [Recent Weight Loss (___ Lbs)] : no recent weight loss [Loss Of Hearing] : no hearing loss [Chest Pain] : no chest pain [Cough] : no cough [Palpitations] : no palpitations [Arthralgias] : arthralgias [Constipation] : no constipation [Diarrhea] : no diarrhea [Skin Lesions] : no skin lesions [Neck Pain] : no neck pain [Dizziness] : no dizziness [Skin Wound] : no skin wound [Itching] : no itching [Swollen Glands] : no swollen glands [Sleep Disturbances] : sleep disturbances [Muscle Weakness] : no muscle weakness [Swollen Glands In The Neck] : no swollen glands in the neck

## 2019-12-02 ENCOUNTER — APPOINTMENT (OUTPATIENT)
Dept: PAIN MANAGEMENT | Facility: CLINIC | Age: 59
End: 2019-12-02
Payer: MEDICARE

## 2019-12-02 VITALS
DIASTOLIC BLOOD PRESSURE: 71 MMHG | WEIGHT: 147 LBS | SYSTOLIC BLOOD PRESSURE: 107 MMHG | HEIGHT: 61 IN | HEART RATE: 69 BPM | BODY MASS INDEX: 27.75 KG/M2

## 2019-12-02 PROCEDURE — 99213 OFFICE O/P EST LOW 20 MIN: CPT

## 2019-12-02 RX ORDER — NORTRIPTYLINE HYDROCHLORIDE 25 MG/1
25 CAPSULE ORAL
Qty: 90 | Refills: 3 | Status: DISCONTINUED | COMMUNITY
Start: 2019-11-11 | End: 2019-12-02

## 2019-12-02 NOTE — PHYSICAL EXAM
[Affect] : the affect was normal [Oriented To Time, Place, And Person] : oriented to person, place, and time [Cranial Nerves Facial (VII)] : face symmetrical [Mood] : the mood was normal [Cranial Nerves Accessory (XI - Cranial And Spinal)] : head turning and shoulder shrug symmetric [Motor Strength] : muscle strength was normal in all four extremities [Cranial Nerves Hypoglossal (XII)] : there was no tongue deviation with protrusion [Sclera] : the sclera and conjunctiva were normal [PERRL With Normal Accommodation] : pupils were equal in size, round, reactive to light, with normal accommodation [Extraocular Movements] : extraocular movements were intact [] : no respiratory distress [Respiration, Rhythm And Depth] : normal respiratory rhythm and effort [Edema] : there was no peripheral edema [Paresis Pronator Drift Right-Sided] : no pronator drift on the right [Paresis Pronator Drift Left-Sided] : no pronator drift on the left [Motor Strength Upper Extremities Bilaterally] : strength was normal in both upper extremities [Motor Strength Lower Extremities Bilaterally] : strength was normal in both lower extremities [Coordination - Dysmetria Impaired Finger-to-Nose Bilateral] : not present

## 2019-12-02 NOTE — HISTORY OF PRESENT ILLNESS
[Nausea] : nausea [Headache] : headache [Photophobia] : photophobia [Phonophobia] : phonophobia [___ Times Per Week] : [unfilled] times each week [FreeTextEntry1] : Pt is here for a follow up visit. \par She was unable to tolerate Nortriptyline as it caused her to feel too groggy. \par She continues to have 3 migraine days per month . Pt tried Rizatriptan 1x but it was not helpful. Pt continues to use Butalbital to abort migraine . Discussed goal to decrease use of Butalbital .  \par Discussed Emgality .

## 2019-12-02 NOTE — ASSESSMENT
[FreeTextEntry1] : re- try Rizatriptan. \par Pamphlet on Emgality given to patient . Pt will consider and call office. \par \par Dr Schmitt on site , billed incident to service.

## 2020-01-23 ENCOUNTER — APPOINTMENT (OUTPATIENT)
Dept: CARDIOLOGY | Facility: CLINIC | Age: 60
End: 2020-01-23

## 2020-02-10 ENCOUNTER — APPOINTMENT (OUTPATIENT)
Dept: PAIN MANAGEMENT | Facility: CLINIC | Age: 60
End: 2020-02-10

## 2021-11-05 ENCOUNTER — APPOINTMENT (OUTPATIENT)
Dept: NEUROLOGY | Facility: CLINIC | Age: 61
End: 2021-11-05
Payer: MEDICARE

## 2021-11-05 VITALS
HEIGHT: 61 IN | BODY MASS INDEX: 29.07 KG/M2 | WEIGHT: 154 LBS | SYSTOLIC BLOOD PRESSURE: 135 MMHG | HEART RATE: 93 BPM | DIASTOLIC BLOOD PRESSURE: 72 MMHG

## 2021-11-05 PROCEDURE — 99204 OFFICE O/P NEW MOD 45 MIN: CPT

## 2021-11-05 PROCEDURE — 99214 OFFICE O/P EST MOD 30 MIN: CPT

## 2021-11-05 NOTE — HISTORY OF PRESENT ILLNESS
[FreeTextEntry1] : 61 W who is here for initial consultation of migraine since 20 yrs of age. She was seen by Dr. Schmitt and she was offered emgality but she never tried it. \par location: back to top of head\par quality: constant vice like feeling\par severity 7-9/10\par freq: 17 times a month\par duration: 1.5 days without treatment\par associated with pulsating white light, nausea, vomiting, phonophobia, no tac's. \par She has tried nortripytline. She takes fioricet prn, rizatriptan, sumatriptan.

## 2021-11-05 NOTE — PHYSICAL EXAM
[General Appearance - Alert] : alert [Oriented To Time, Place, And Person] : oriented to person, place, and time [Person] : oriented to person [Place] : oriented to place [Time] : oriented to time [Short Term Intact] : short term memory intact [Fluency] : fluency intact [Current Events] : adequate knowledge of current events [Cranial Nerves Oculomotor (III)] : extraocular motion intact [Cranial Nerves Optic (II)] : visual acuity intact bilaterally,  visual fields full to confrontation, pupils equal round and reactive to light [Cranial Nerves Vestibulocochlear (VIII)] : hearing was intact bilaterally [Cranial Nerves Accessory (XI - Cranial And Spinal)] : head turning and shoulder shrug symmetric [Motor Tone] : muscle tone was normal in all four extremities [Motor Strength] : muscle strength was normal in all four extremities [Sensation Tactile Decrease] : light touch was intact [Abnormal Walk] : normal gait [Coordination - Dysmetria Impaired Finger-to-Nose Bilateral] : not present [1+] : Patella left 1+

## 2021-11-05 NOTE — DISCUSSION/SUMMARY
[FreeTextEntry1] : 61 W who presents for initial consultation of migraine and medication overuse headache. She will try topamax 50mg bid. no glaucoma or kidney stone history. We may try propranolol if topamax does not help. \par \par \par I spent the time noted on the day of this patient encounter preparing for, providing and documenting the above E/M service and counseling and educate patient on differential, workup, disease course, and treatment/management. Education was provided to the patient during this encounter. All questions and concerns were answered and addressed in detail. The patient verbalized understanding and agreed to plan. Patient was advised to continue to monitor for neurologic symptoms and to notify my office or go to the nearest emergency room if there are any changes. Any orders/referrals and communications were provided as well. \par Side effects of the above medications were discussed in detail including but not limited to applicable black box warning and teratogenicity as appropriate. \par Patient was advised to bring previous records to my office. \par \par \par

## 2021-12-06 RX ORDER — TOPIRAMATE 50 MG/1
50 TABLET, FILM COATED ORAL
Qty: 60 | Refills: 0 | Status: DISCONTINUED | COMMUNITY
Start: 2021-11-05 | End: 2021-12-06

## 2021-12-29 ENCOUNTER — APPOINTMENT (OUTPATIENT)
Dept: NEUROLOGY | Facility: CLINIC | Age: 61
End: 2021-12-29

## 2021-12-30 ENCOUNTER — APPOINTMENT (OUTPATIENT)
Dept: NEUROLOGY | Facility: CLINIC | Age: 61
End: 2021-12-30

## 2022-01-31 ENCOUNTER — APPOINTMENT (OUTPATIENT)
Dept: NEUROLOGY | Facility: CLINIC | Age: 62
End: 2022-01-31
Payer: MEDICARE

## 2022-01-31 VITALS
BODY MASS INDEX: 27.38 KG/M2 | RESPIRATION RATE: 16 BRPM | HEIGHT: 61 IN | WEIGHT: 145 LBS | HEART RATE: 78 BPM | DIASTOLIC BLOOD PRESSURE: 71 MMHG | SYSTOLIC BLOOD PRESSURE: 123 MMHG

## 2022-01-31 PROCEDURE — 99214 OFFICE O/P EST MOD 30 MIN: CPT

## 2022-01-31 RX ORDER — RIZATRIPTAN BENZOATE 10 MG/1
10 TABLET ORAL
Qty: 12 | Refills: 5 | Status: DISCONTINUED | COMMUNITY
Start: 2019-11-11 | End: 2022-01-31

## 2022-01-31 RX ORDER — PROPRANOLOL HYDROCHLORIDE 60 MG/1
60 CAPSULE, EXTENDED RELEASE ORAL
Qty: 30 | Refills: 0 | Status: DISCONTINUED | COMMUNITY
Start: 2021-12-06 | End: 2022-01-31

## 2022-01-31 NOTE — HISTORY OF PRESENT ILLNESS
[FreeTextEntry1] : 61 W who is here for initial consultation of migraine since 20 yrs of age. She was seen by Dr. Schmitt and she was offered emgality but she never tried it. \par location: back to top of head\par quality: constant vice like feeling\par severity 7-9/10\par freq: 17 times a month\par duration: 1.5 days without treatment\par associated with pulsating white light, nausea, vomiting, phonophobia, no tac's. \par She has tried nortripytline. She takes fioricet prn, rizatriptan, sumatriptan.\par \par Interval history. Since her last visit patient stopped the Topamax and was started on propranolol in December. Patient states that the propranolol didn't work neither and she has been using Fioricet symptoms 5 times a week and then she would stop however the following week she would have it restarted again. She was again informed that appears that is a major cause of her rebound headache and she should taper off. Patient has tried rizatriptan and sumatriptan in the past however they made her "loopy."

## 2022-01-31 NOTE — DISCUSSION/SUMMARY
[FreeTextEntry1] : 61-year-old woman who is here for follow-up of her chronic migraine and medication overuse headache. Patient has tried nortriptyline, Topamax, propranolol which did not decrease her headache. Will try Botox injections. For preventative therapy patient will try frovatriptan as needed. Patient was again advised not to use Fioricet.\par \par I spent the time noted on the day of this patient encounter preparing for, providing and documenting the above E/M service and counseling and educate patient on differential, workup, disease course, and treatment/management. Education was provided to the patient during this encounter. All questions and concerns were answered and addressed in detail. The patient verbalized understanding and agreed to plan. Patient was advised to continue to monitor for neurologic symptoms and to notify my office or go to the nearest emergency room if there are any changes. Any orders/referrals and communications were provided as well. \par Side effects of the above medications were discussed in detail including but not limited to applicable black box warning and teratogenicity as appropriate. \par Patient was advised to bring previous records to my office. \par \par \par

## 2022-02-03 ENCOUNTER — APPOINTMENT (OUTPATIENT)
Dept: NEUROLOGY | Facility: CLINIC | Age: 62
End: 2022-02-03
Payer: MEDICARE

## 2022-02-03 VITALS
WEIGHT: 145 LBS | DIASTOLIC BLOOD PRESSURE: 76 MMHG | BODY MASS INDEX: 27.38 KG/M2 | HEIGHT: 61 IN | HEART RATE: 76 BPM | SYSTOLIC BLOOD PRESSURE: 124 MMHG

## 2022-02-03 PROCEDURE — 99212 OFFICE O/P EST SF 10 MIN: CPT | Mod: 25

## 2022-02-03 PROCEDURE — 64615 CHEMODENERV MUSC MIGRAINE: CPT

## 2022-02-03 NOTE — HISTORY OF PRESENT ILLNESS
[FreeTextEntry1] : 61 W who is here for initial consultation of migraine since 20 yrs of age. She was seen by Dr. Schmitt and she was offered emgality but she never tried it. \par location: back to top of head\par quality: constant vice like feeling\par severity 7-9/10\par freq: 17 times a month\par duration: 1.5 days without treatment\par associated with pulsating white light, nausea, vomiting, phonophobia, no tac's. \par She has tried nortripytline. She takes fioricet prn, rizatriptan, sumatriptan.\par \par Interval history. Since her last visit patient stopped the Topamax and was started on propranolol in December. Patient states that the propranolol didn't work neither and she has been using Fioricet symptoms 5 times a week and then she would stop however the following week she would have it restarted again. She was again informed that appears that is a major cause of her rebound headache and she should taper off. Patient has tried rizatriptan and sumatriptan in the past however they made her "loopy." \par \par Interval history: Patient has not started the frovatriptan.  She is here for her Botox injection.

## 2022-04-13 ENCOUNTER — APPOINTMENT (OUTPATIENT)
Dept: NEUROLOGY | Facility: CLINIC | Age: 62
End: 2022-04-13
Payer: MEDICARE

## 2022-04-13 VITALS
SYSTOLIC BLOOD PRESSURE: 126 MMHG | BODY MASS INDEX: 27.38 KG/M2 | DIASTOLIC BLOOD PRESSURE: 80 MMHG | HEART RATE: 74 BPM | HEIGHT: 61 IN | WEIGHT: 145 LBS

## 2022-04-13 DIAGNOSIS — M79.10 MYALGIA, UNSPECIFIED SITE: ICD-10-CM

## 2022-04-13 PROCEDURE — 99214 OFFICE O/P EST MOD 30 MIN: CPT | Mod: 25

## 2022-04-13 PROCEDURE — 20552 NJX 1/MLT TRIGGER POINT 1/2: CPT

## 2022-04-13 NOTE — PHYSICAL EXAM
[Person] : oriented to person [Time] : oriented to time [Place] : oriented to place [Short Term Intact] : short term memory intact [Fluency] : fluency intact [Current Events] : adequate knowledge of current events [Cranial Nerves Optic (II)] : visual acuity intact bilaterally,  visual fields full to confrontation, pupils equal round and reactive to light [Cranial Nerves Vestibulocochlear (VIII)] : hearing was intact bilaterally [Cranial Nerves Oculomotor (III)] : extraocular motion intact [Cranial Nerves Accessory (XI - Cranial And Spinal)] : head turning and shoulder shrug symmetric [Motor Tone] : muscle tone was normal in all four extremities [Motor Strength] : muscle strength was normal in all four extremities [Sensation Tactile Decrease] : light touch was intact [Abnormal Walk] : normal gait [Coordination - Dysmetria Impaired Finger-to-Nose Bilateral] : not present [1+] : Patella left 1+

## 2022-04-13 NOTE — DISCUSSION/SUMMARY
[FreeTextEntry1] : 61-year-old woman with a history of chronic migraine is here for occipital nerve block as patient has upcoming appointment for Botox.  If this continues to be a pattern will try other agents.  We will look into her insurance coverage for frovatriptan.\par \par I spent the time noted on the day of this patient encounter preparing for, providing and documenting the above E/M service and counseling and educate patient on differential, workup, disease course, and treatment/management. Education was provided to the patient during this encounter. All questions and concerns were answered and addressed in detail. The patient verbalized understanding and agreed to plan. Patient was advised to continue to monitor for neurologic symptoms and to notify my office or go to the nearest emergency room if there are any changes. Any orders/referrals and communications were provided as well. \par Side effects of the above medications were discussed in detail including but not limited to applicable black box warning and teratogenicity as appropriate. \par Patient was advised to bring previous records to my office. \par \par \par

## 2022-04-13 NOTE — HISTORY OF PRESENT ILLNESS
[FreeTextEntry1] : 61 W who is here for initial consultation of migraine since 20 yrs of age. She was seen by Dr. Schmitt and she was offered emgality but she never tried it. \par location: back to top of head\par quality: constant vice like feeling\par severity 7-9/10\par freq: 17 times a month\par duration: 1.5 days without treatment\par associated with pulsating white light, nausea, vomiting, phonophobia, no tac's. \par She has tried nortripytline. She takes fioricet prn, rizatriptan, sumatriptan.\par \par Interval history. Since her last visit patient stopped the Topamax and was started on propranolol in December. Patient states that the propranolol didn't work neither and she has been using Fioricet symptoms 5 times a week and then she would stop however the following week she would have it restarted again. She was again informed that appears that is a major cause of her rebound headache and she should taper off. Patient has tried rizatriptan and sumatriptan in the past however they made her "loopy." \par \par Interval history: Patient has not started the frovatriptan.  She is here for her Botox injection.\par \par Interval history: Patient states that the insurance may not have approved frovatriptan.  I have reached out to my staff to look into this further and to see what alternatives there are.  Patient had her Botox injection about 10 weeks ago and has wearing off headache.  Patient is here for occipital nerve block.

## 2022-04-13 NOTE — PROCEDURE
[FreeTextEntry1] : consent obtained. \par lidocaine was injected bilaterally above and below the occipital nerves 4cc total\par Bilateral temporalis muscles 2 cc total\par Bilateral medial aspect of eyebrows 2cc total\par \par Patient tolerated the procedure well.  Side effects were discussed with the patient in detail.

## 2022-04-25 RX ORDER — FROVATRIPTAN SUCCINATE 2.5 MG/1
2.5 TABLET, FILM COATED ORAL
Qty: 15 | Refills: 2 | Status: DISCONTINUED | COMMUNITY
Start: 2022-01-31 | End: 2022-04-25

## 2022-04-27 ENCOUNTER — APPOINTMENT (OUTPATIENT)
Dept: NEUROLOGY | Facility: CLINIC | Age: 62
End: 2022-04-27
Payer: MEDICARE

## 2022-04-27 VITALS
HEART RATE: 76 BPM | WEIGHT: 145 LBS | BODY MASS INDEX: 27.38 KG/M2 | HEIGHT: 61 IN | SYSTOLIC BLOOD PRESSURE: 124 MMHG | DIASTOLIC BLOOD PRESSURE: 80 MMHG

## 2022-04-27 DIAGNOSIS — G44.40 DRUG-INDUCED HEADACHE, NOT ELSEWHERE CLASSIFIED, NOT INTRACTABLE: ICD-10-CM

## 2022-04-27 PROCEDURE — 64615 CHEMODENERV MUSC MIGRAINE: CPT

## 2022-04-27 PROCEDURE — 99214 OFFICE O/P EST MOD 30 MIN: CPT | Mod: 25

## 2022-04-27 NOTE — HISTORY OF PRESENT ILLNESS
[FreeTextEntry1] : 61 W who is here for initial consultation of migraine since 20 yrs of age. She was seen by Dr. Schmitt and she was offered emgality but she never tried it. \par location: back to top of head\par quality: constant vice like feeling\par severity 7-9/10\par freq: 17 times a month\par duration: 1.5 days without treatment\par associated with pulsating white light, nausea, vomiting, phonophobia, no tac's. \par She has tried nortripytline. She takes fioricet prn, rizatriptan, sumatriptan.\par \par Interval history. Since her last visit patient stopped the Topamax and was started on propranolol in December. Patient states that the propranolol didn't work neither and she has been using Fioricet symptoms 5 times a week and then she would stop however the following week she would have it restarted again. She was again informed that appears that is a major cause of her rebound headache and she should taper off. Patient has tried rizatriptan and sumatriptan in the past however they made her "loopy." \par \par Interval history: Patient has not started the frovatriptan.  She is here for her Botox injection.\par \par Interval history: Patient states that the insurance may not have approved frovatriptan.  I have reached out to my staff to look into this further and to see what alternatives there are.  Patient had her Botox injection about 10 weeks ago and has wearing off headache.  Patient is here for occipital nerve block.\par \par interval history: pt has not yet tried the naratriptan.

## 2022-04-27 NOTE — DISCUSSION/SUMMARY
[FreeTextEntry1] : 61 W who is here for botox injections. Will try the 3rd dose in 3 months. May change to something else if it doesn't help her headaches. She was advised to stay away from fioricet. Will try naratriptan. Frova was not covered by insurance.\par \par I spent the time noted on the day of this patient encounter preparing for, providing and documenting the above E/M service and counseling and educate patient on differential, workup, disease course, and treatment/management. Education was provided to the patient during this encounter. All questions and concerns were answered and addressed in detail. The patient verbalized understanding and agreed to plan. Patient was advised to continue to monitor for neurologic symptoms and to notify my office or go to the nearest emergency room if there are any changes. Any orders/referrals and communications were provided as well. \par Side effects of the above medications were discussed in detail including but not limited to applicable black box warning and teratogenicity as appropriate. \par Patient was advised to bring previous records to my office. \par \par \par

## 2022-09-07 ENCOUNTER — APPOINTMENT (OUTPATIENT)
Dept: NEUROLOGY | Facility: CLINIC | Age: 62
End: 2022-09-07

## 2022-09-07 PROCEDURE — 99215 OFFICE O/P EST HI 40 MIN: CPT

## 2022-09-07 NOTE — HISTORY OF PRESENT ILLNESS
[FreeTextEntry1] : 62 W who is here for initial consultation of migraine since 20 yrs of age. She was seen by Dr. Schmitt and she was offered emgality but she never tried it. \par location: back to top of head\par quality: constant vice like feeling\par severity 7-9/10\par freq: 17 times a month\par duration: 1.5 days without treatment\par associated with pulsating white light, nausea, vomiting, phonophobia, no tac's. \par She has tried nortripytline. She takes fioricet prn, rizatriptan, sumatriptan.\par \par Interval history. Since her last visit patient stopped the Topamax and was started on propranolol in December. Patient states that the propranolol didn't work neither and she has been using Fioricet symptoms 5 times a week and then she would stop however the following week she would have it restarted again. She was again informed that appears that is a major cause of her rebound headache and she should taper off. Patient has tried rizatriptan and sumatriptan in the past however they made her "loopy." \par \par Interval history: Patient has not started the frovatriptan.  She is here for her Botox injection.\par \par Interval history: Patient states that the insurance may not have approved frovatriptan.  I have reached out to my staff to look into this further and to see what alternatives there are.  Patient had her Botox injection about 10 weeks ago and has wearing off headache.  Patient is here for occipital nerve block.\par \par interval history: pt has not yet tried the naratriptan. \par \par interval history: Patient was supposed to come in for Botox injection today.  Patient states that she does not want to have the Botox injection as she was getting a bill from the insurance company of over $200 for her last Botox.  Patient will forward the invoice to my  who obtained the authorization for the Botox.  We decided to skip the Botox injection because of the financial confusion.  Patient's friend had relief with gabapentin for her migraine and she was interested in trying that.

## 2022-09-07 NOTE — DISCUSSION/SUMMARY
[FreeTextEntry1] : 62-year-old woman who is here for follow-up for her chronic migraine.  Her last Botox injection was late April and she was supposed to come back in 3 months for her third dose.  Patient states that she would like to forego the Botox during this visit because of financial questions.  This will be settled with my .  Patient will try gabapentin 300 mg at bedtime for prevention of chronic migraine as per patient request.  Side effects were discussed with the patient in detail and a slow initiation schedule was discussed with the patient in detail.  She was asked not to drive until she knows how it will affect her.\par \par I spent the time noted on the day of this patient encounter preparing for, providing and documenting the above E/M service and counseling and educate patient on differential, workup, disease course, and treatment/management. Education was provided to the patient during this encounter. All questions and concerns were answered and addressed in detail. The patient verbalized understanding and agreed to plan. Patient was advised to continue to monitor for neurologic symptoms and to notify my office or go to the nearest emergency room if there are any changes. Any orders/referrals and communications were provided as well. \par Side effects of the above medications were discussed in detail including but not limited to applicable black box warning and teratogenicity as appropriate. \par Patient was advised to bring previous records to my office. \par \par \par

## 2022-10-12 ENCOUNTER — RX RENEWAL (OUTPATIENT)
Age: 62
End: 2022-10-12

## 2022-12-08 ENCOUNTER — APPOINTMENT (OUTPATIENT)
Dept: NEUROLOGY | Facility: CLINIC | Age: 62
End: 2022-12-08

## 2023-01-02 ENCOUNTER — RX RENEWAL (OUTPATIENT)
Age: 63
End: 2023-01-02

## 2023-03-22 ENCOUNTER — APPOINTMENT (OUTPATIENT)
Dept: NEUROLOGY | Facility: CLINIC | Age: 63
End: 2023-03-22
Payer: MEDICARE

## 2023-03-22 VITALS
DIASTOLIC BLOOD PRESSURE: 64 MMHG | OXYGEN SATURATION: 98 % | TEMPERATURE: 97.6 F | RESPIRATION RATE: 16 BRPM | BODY MASS INDEX: 30.43 KG/M2 | HEIGHT: 60 IN | SYSTOLIC BLOOD PRESSURE: 124 MMHG | WEIGHT: 155 LBS | HEART RATE: 73 BPM

## 2023-03-22 PROCEDURE — 99215 OFFICE O/P EST HI 40 MIN: CPT

## 2023-03-22 RX ORDER — ONABOTULINUMTOXINA 200 [USP'U]/1
200 INJECTION, POWDER, LYOPHILIZED, FOR SOLUTION INTRADERMAL; INTRAMUSCULAR
Qty: 1 | Refills: 3 | Status: ACTIVE | COMMUNITY
Start: 2022-01-31 | End: 1900-01-01

## 2023-03-22 NOTE — DISCUSSION/SUMMARY
[FreeTextEntry1] :  62-year-old woman who is here for follow-up of her chronic migraine.  Patient will retry Botox injections and occipital nerve blocks.  Patient will come in as soon as we get authorization for her Botox.\par \par I spent the time noted on the day of this patient encounter preparing for, providing and documenting the above E/M service and counseling and educate patient on differential, workup, disease course, and treatment/management. Education was provided to the patient during this encounter. All questions and concerns were answered and addressed in detail. The patient verbalized understanding and agreed to plan. Patient was advised to continue to monitor for neurologic symptoms and to notify my office or go to the nearest emergency room if there are any changes. Any orders/referrals and communications were provided as well. \par Side effects of the above medications were discussed in detail including but not limited to applicable black box warning and teratogenicity as appropriate. \par Patient was advised to bring previous records to my office. \par \par \par

## 2023-03-22 NOTE — HISTORY OF PRESENT ILLNESS
[FreeTextEntry1] : 62 W who is here for initial consultation of migraine since 20 yrs of age. She was seen by Dr. Schmitt and she was offered emgality but she never tried it. \par location: back to top of head\par quality: constant vice like feeling\par severity 7-9/10\par freq: 17 times a month\par duration: 1.5 days without treatment\par associated with pulsating white light, nausea, vomiting, phonophobia, no tac's. \par She has tried nortripytline. She takes fioricet prn, rizatriptan, sumatriptan.\par \par Interval history. Since her last visit patient stopped the Topamax and was started on propranolol in December. Patient states that the propranolol didn't work neither and she has been using Fioricet symptoms 5 times a week and then she would stop however the following week she would have it restarted again. She was again informed that appears that is a major cause of her rebound headache and she should taper off. Patient has tried rizatriptan and sumatriptan in the past however they made her "loopy." \par \par Interval history: Patient has not started the frovatriptan.  She is here for her Botox injection.\par \par Interval history: Patient states that the insurance may not have approved frovatriptan.  I have reached out to my staff to look into this further and to see what alternatives there are.  Patient had her Botox injection about 10 weeks ago and has wearing off headache.  Patient is here for occipital nerve block.\par \par interval history: pt has not yet tried the naratriptan. \par \par interval history: Patient was supposed to come in for Botox injection today.  Patient states that she does not want to have the Botox injection as she was getting a bill from the insurance company of over $200 for her last Botox.  Patient will forward the invoice to my  who obtained the authorization for the Botox.  We decided to skip the Botox injection because of the financial confusion.  Patient's friend had relief with gabapentin for her migraine and she was interested in trying that.\par \par Interval history March 22, 2023: Patient states that on gabapentin 300 mg at night she felt depressed and she had stopped taking the medication.  Patient has been using butalbital and would like to return to Botox as it has helped in the past.  Patient states that about 2-1/2 months into it her headaches would return and we may try occipital nerve blocks during the Botox trial again.

## 2023-05-17 ENCOUNTER — APPOINTMENT (OUTPATIENT)
Dept: NEUROLOGY | Facility: CLINIC | Age: 63
End: 2023-05-17
Payer: MEDICARE

## 2023-05-17 VITALS
DIASTOLIC BLOOD PRESSURE: 79 MMHG | OXYGEN SATURATION: 98 % | BODY MASS INDEX: 30.43 KG/M2 | WEIGHT: 155 LBS | HEART RATE: 77 BPM | HEIGHT: 60 IN | TEMPERATURE: 97.3 F | SYSTOLIC BLOOD PRESSURE: 120 MMHG | RESPIRATION RATE: 16 BRPM

## 2023-05-17 PROCEDURE — 99212 OFFICE O/P EST SF 10 MIN: CPT | Mod: 25

## 2023-05-17 PROCEDURE — 64615 CHEMODENERV MUSC MIGRAINE: CPT

## 2023-05-17 NOTE — HISTORY OF PRESENT ILLNESS
[FreeTextEntry1] : 62 W who is here for initial consultation of migraine since 20 yrs of age. She was seen by Dr. Schmitt and she was offered emgality but she never tried it. \par location: back to top of head\par quality: constant vice like feeling\par severity 7-9/10\par freq: 17 times a month\par duration: 1.5 days without treatment\par associated with pulsating white light, nausea, vomiting, phonophobia, no tac's. \par She has tried nortripytline. She takes fioricet prn, rizatriptan, sumatriptan.\par \par Interval history. Since her last visit patient stopped the Topamax and was started on propranolol in December. Patient states that the propranolol didn't work neither and she has been using Fioricet symptoms 5 times a week and then she would stop however the following week she would have it restarted again. She was again informed that appears that is a major cause of her rebound headache and she should taper off. Patient has tried rizatriptan and sumatriptan in the past however they made her "loopy." \par \par Interval history: Patient has not started the frovatriptan.  She is here for her Botox injection.\par \par Interval history: Patient states that the insurance may not have approved frovatriptan.  I have reached out to my staff to look into this further and to see what alternatives there are.  Patient had her Botox injection about 10 weeks ago and has wearing off headache.  Patient is here for occipital nerve block.\par \par interval history: pt has not yet tried the naratriptan. \par \par interval history: Patient was supposed to come in for Botox injection today.  Patient states that she does not want to have the Botox injection as she was getting a bill from the insurance company of over $200 for her last Botox.  Patient will forward the invoice to my  who obtained the authorization for the Botox.  We decided to skip the Botox injection because of the financial confusion.  Patient's friend had relief with gabapentin for her migraine and she was interested in trying that.\par \par Interval history March 22, 2023: Patient states that on gabapentin 300 mg at night she felt depressed and she had stopped taking the medication.  Patient has been using butalbital and would like to return to Botox as it has helped in the past.  Patient states that about 2-1/2 months into it her headaches would return and we may try occipital nerve blocks during the Botox trial again.\par \par Interval history May 17, 2023 patient is here for Botox injection.  Patient had less hiatus with the Botox and this is her second Botox.  We will give the Botox 3 injections to see if that will help with her migraines.

## 2023-06-28 ENCOUNTER — APPOINTMENT (OUTPATIENT)
Dept: NEUROLOGY | Facility: CLINIC | Age: 63
End: 2023-06-28

## 2023-07-12 ENCOUNTER — APPOINTMENT (OUTPATIENT)
Dept: NEUROLOGY | Facility: CLINIC | Age: 63
End: 2023-07-12
Payer: MEDICARE

## 2023-07-12 VITALS
SYSTOLIC BLOOD PRESSURE: 109 MMHG | TEMPERATURE: 98.3 F | OXYGEN SATURATION: 98 % | WEIGHT: 155 LBS | BODY MASS INDEX: 30.43 KG/M2 | HEIGHT: 60 IN | HEART RATE: 68 BPM | DIASTOLIC BLOOD PRESSURE: 69 MMHG

## 2023-07-12 DIAGNOSIS — M54.81 OCCIPITAL NEURALGIA: ICD-10-CM

## 2023-07-12 PROCEDURE — 64615 CHEMODENERV MUSC MIGRAINE: CPT

## 2023-09-25 ENCOUNTER — APPOINTMENT (OUTPATIENT)
Dept: CARDIOLOGY | Facility: CLINIC | Age: 63
End: 2023-09-25
Payer: MEDICARE

## 2023-09-25 ENCOUNTER — NON-APPOINTMENT (OUTPATIENT)
Age: 63
End: 2023-09-25

## 2023-09-25 VITALS
WEIGHT: 152.19 LBS | DIASTOLIC BLOOD PRESSURE: 62 MMHG | SYSTOLIC BLOOD PRESSURE: 116 MMHG | BODY MASS INDEX: 29.72 KG/M2 | HEART RATE: 66 BPM | OXYGEN SATURATION: 98 %

## 2023-09-25 DIAGNOSIS — I51.7 CARDIOMEGALY: ICD-10-CM

## 2023-09-25 DIAGNOSIS — R07.89 OTHER CHEST PAIN: ICD-10-CM

## 2023-09-25 DIAGNOSIS — R09.89 OTHER SPECIFIED SYMPTOMS AND SIGNS INVOLVING THE CIRCULATORY AND RESPIRATORY SYSTEMS: ICD-10-CM

## 2023-09-25 PROCEDURE — 93000 ELECTROCARDIOGRAM COMPLETE: CPT

## 2023-09-25 PROCEDURE — 36415 COLL VENOUS BLD VENIPUNCTURE: CPT

## 2023-09-25 PROCEDURE — 99203 OFFICE O/P NEW LOW 30 MIN: CPT | Mod: 25

## 2023-10-02 LAB
CHOLEST SERPL-MCNC: 226 MG/DL
HDLC SERPL-MCNC: 95 MG/DL
LDLC SERPL CALC-MCNC: 120 MG/DL
LDLC SERPL DIRECT ASSAY-MCNC: 123 MG/DL
NONHDLC SERPL-MCNC: 130 MG/DL
TRIGL SERPL-MCNC: 58 MG/DL

## 2023-10-18 ENCOUNTER — APPOINTMENT (OUTPATIENT)
Dept: NEUROLOGY | Facility: CLINIC | Age: 63
End: 2023-10-18
Payer: MEDICARE

## 2023-10-18 VITALS
HEART RATE: 65 BPM | OXYGEN SATURATION: 98 % | TEMPERATURE: 98.1 F | SYSTOLIC BLOOD PRESSURE: 125 MMHG | DIASTOLIC BLOOD PRESSURE: 72 MMHG | HEIGHT: 60 IN | WEIGHT: 152 LBS | BODY MASS INDEX: 29.84 KG/M2

## 2023-10-18 PROCEDURE — 64615 CHEMODENERV MUSC MIGRAINE: CPT

## 2023-11-06 ENCOUNTER — RX RENEWAL (OUTPATIENT)
Age: 63
End: 2023-11-06

## 2023-11-06 RX ORDER — GABAPENTIN 100 MG/1
100 CAPSULE ORAL
Qty: 270 | Refills: 3 | Status: ACTIVE | COMMUNITY
Start: 2022-09-07 | End: 1900-01-01

## 2023-11-24 NOTE — ASSESSMENT
[FreeTextEntry1] : Atypical CP\par \par Carotid Bruit\par \par LAD on EKG
Urgent need for Intervention

## 2023-12-18 ENCOUNTER — APPOINTMENT (OUTPATIENT)
Dept: CARDIOLOGY | Facility: CLINIC | Age: 63
End: 2023-12-18
Payer: MEDICARE

## 2023-12-18 PROCEDURE — 93880 EXTRACRANIAL BILAT STUDY: CPT

## 2023-12-18 PROCEDURE — 93306 TTE W/DOPPLER COMPLETE: CPT

## 2023-12-22 ENCOUNTER — NON-APPOINTMENT (OUTPATIENT)
Age: 63
End: 2023-12-22

## 2024-01-08 ENCOUNTER — APPOINTMENT (OUTPATIENT)
Dept: CARDIOLOGY | Facility: CLINIC | Age: 64
End: 2024-01-08

## 2024-03-06 ENCOUNTER — APPOINTMENT (OUTPATIENT)
Dept: NEUROLOGY | Facility: CLINIC | Age: 64
End: 2024-03-06
Payer: MEDICARE

## 2024-03-06 VITALS
OXYGEN SATURATION: 96 % | HEART RATE: 87 BPM | SYSTOLIC BLOOD PRESSURE: 122 MMHG | TEMPERATURE: 98 F | BODY MASS INDEX: 28.33 KG/M2 | DIASTOLIC BLOOD PRESSURE: 82 MMHG | HEIGHT: 61.5 IN | WEIGHT: 152 LBS

## 2024-03-06 DIAGNOSIS — G43.709 CHRONIC MIGRAINE W/OUT AURA, NOT INTRACTABLE, W/OUT STATUS MIGRAINOSUS: ICD-10-CM

## 2024-03-06 PROCEDURE — 64615 CHEMODENERV MUSC MIGRAINE: CPT

## 2024-03-06 RX ORDER — NARATRIPTAN 2.5 MG/1
2.5 TABLET, FILM COATED ORAL
Qty: 9 | Refills: 3 | Status: COMPLETED | COMMUNITY
Start: 2022-04-25 | End: 2024-03-06

## 2024-04-04 NOTE — ED PROVIDER NOTE - NSTIMEPROVIDERCAREINITIATE_GEN_ER
Per MD, verbally, patient was called by Dr. Al to discuss below last evening. No action needed at this time.   18-Jan-2019 20:05

## 2024-05-24 ENCOUNTER — NON-APPOINTMENT (OUTPATIENT)
Age: 64
End: 2024-05-24

## 2024-07-02 ENCOUNTER — NON-APPOINTMENT (OUTPATIENT)
Age: 64
End: 2024-07-02

## 2024-07-03 ENCOUNTER — APPOINTMENT (OUTPATIENT)
Dept: NEUROLOGY | Facility: CLINIC | Age: 64
End: 2024-07-03
Payer: MEDICARE

## 2024-07-03 VITALS
HEIGHT: 61.5 IN | OXYGEN SATURATION: 98 % | BODY MASS INDEX: 27.58 KG/M2 | TEMPERATURE: 98.2 F | DIASTOLIC BLOOD PRESSURE: 66 MMHG | HEART RATE: 76 BPM | WEIGHT: 148 LBS | SYSTOLIC BLOOD PRESSURE: 102 MMHG

## 2024-07-03 DIAGNOSIS — G43.709 CHRONIC MIGRAINE W/OUT AURA, NOT INTRACTABLE, W/OUT STATUS MIGRAINOSUS: ICD-10-CM

## 2024-07-03 PROCEDURE — 64615 CHEMODENERV MUSC MIGRAINE: CPT

## 2024-09-19 NOTE — ED ADULT TRIAGE NOTE - AS O2 DELIVERY
Therapeutic lifestyle changes. Declines statin or other cholesterol medication. Defers CT Coronary Artery Calcium Score.        room air

## 2024-10-07 ENCOUNTER — RX RENEWAL (OUTPATIENT)
Age: 64
End: 2024-10-07

## 2024-12-04 ENCOUNTER — APPOINTMENT (OUTPATIENT)
Dept: NEUROLOGY | Facility: CLINIC | Age: 64
End: 2024-12-04
Payer: MEDICARE

## 2024-12-04 VITALS
OXYGEN SATURATION: 98 % | HEART RATE: 79 BPM | DIASTOLIC BLOOD PRESSURE: 71 MMHG | TEMPERATURE: 98.1 F | BODY MASS INDEX: 27.58 KG/M2 | HEIGHT: 61.5 IN | SYSTOLIC BLOOD PRESSURE: 114 MMHG | WEIGHT: 148 LBS

## 2024-12-04 DIAGNOSIS — G43.709 CHRONIC MIGRAINE W/OUT AURA, NOT INTRACTABLE, W/OUT STATUS MIGRAINOSUS: ICD-10-CM

## 2024-12-04 PROCEDURE — 99215 OFFICE O/P EST HI 40 MIN: CPT | Mod: 25

## 2024-12-04 PROCEDURE — 64615 CHEMODENERV MUSC MIGRAINE: CPT

## 2025-01-14 ENCOUNTER — OUTPATIENT (OUTPATIENT)
Dept: OUTPATIENT SERVICES | Facility: HOSPITAL | Age: 65
LOS: 1 days | End: 2025-01-14

## 2025-01-14 VITALS
SYSTOLIC BLOOD PRESSURE: 103 MMHG | HEIGHT: 61 IN | WEIGHT: 149.91 LBS | DIASTOLIC BLOOD PRESSURE: 66 MMHG | TEMPERATURE: 98 F | OXYGEN SATURATION: 97 % | HEART RATE: 72 BPM | RESPIRATION RATE: 16 BRPM

## 2025-01-14 DIAGNOSIS — L05.91 PILONIDAL CYST WITHOUT ABSCESS: Chronic | ICD-10-CM

## 2025-01-14 DIAGNOSIS — Z98.890 OTHER SPECIFIED POSTPROCEDURAL STATES: Chronic | ICD-10-CM

## 2025-01-14 DIAGNOSIS — N84.0 POLYP OF CORPUS UTERI: ICD-10-CM

## 2025-01-14 DIAGNOSIS — Z98.82 BREAST IMPLANT STATUS: Chronic | ICD-10-CM

## 2025-01-14 RX ORDER — SODIUM CHLORIDE 9 G/ML
1000 INJECTION, SOLUTION INTRAVENOUS
Refills: 0 | Status: DISCONTINUED | OUTPATIENT
Start: 2025-01-22 | End: 2025-02-05

## 2025-01-14 NOTE — H&P PST ADULT - PROBLEM SELECTOR PLAN 1
Patient tentatively scheduled for operative hysteroscopy polypectomy using myosure dilation and curettage for 1/22/25. Pre-op instructions provided. Pt given verbal and written instructions with teach back on pepcid. Pt verbalized understanding with return demonstration.

## 2025-01-14 NOTE — H&P PST ADULT - NSICDXFAMILYHX_GEN_ALL_CORE_FT
FAMILY HISTORY:  Father  Still living? Unknown  Family history of atrial fibrillation, Age at diagnosis: Age Unknown  Family history of mitral valve disorder, Age at diagnosis: Age Unknown

## 2025-01-14 NOTE — H&P PST ADULT - HISTORY OF PRESENT ILLNESS
65 y/o female PMH LVH migraines presents to presurgical testing with diagnosis of polyp of corpus uteri and pelvic and perineal pain. Pt denies abnormal vaginal bleeding. Pt is scheduled for an operative hysteroscopy polypectomy using myosure dilation and curettage

## 2025-01-14 NOTE — H&P PST ADULT - LAST ECHOCARDIOGRAM
12/2023 (allscripts) EF 62% left ventricular systolic function is normal, left atrial is mildly dilated, increased LV mass and hypertrophy

## 2025-01-14 NOTE — H&P PST ADULT - NSICDXPASTMEDICALHX_GEN_ALL_CORE_FT
PAST MEDICAL HISTORY:  Former smoker, stopped smoking in distant past     Left ventricular hypertrophy     Migraines     Polyp of corpus uteri

## 2025-01-22 ENCOUNTER — TRANSCRIPTION ENCOUNTER (OUTPATIENT)
Age: 65
End: 2025-01-22

## 2025-01-22 ENCOUNTER — OUTPATIENT (OUTPATIENT)
Dept: OUTPATIENT SERVICES | Facility: HOSPITAL | Age: 65
LOS: 1 days | Discharge: HOME | End: 2025-01-22
Payer: MEDICARE

## 2025-01-22 VITALS
TEMPERATURE: 98 F | RESPIRATION RATE: 15 BRPM | HEART RATE: 80 BPM | SYSTOLIC BLOOD PRESSURE: 108 MMHG | OXYGEN SATURATION: 98 % | DIASTOLIC BLOOD PRESSURE: 64 MMHG

## 2025-01-22 VITALS
WEIGHT: 149.91 LBS | SYSTOLIC BLOOD PRESSURE: 121 MMHG | RESPIRATION RATE: 16 BRPM | OXYGEN SATURATION: 98 % | DIASTOLIC BLOOD PRESSURE: 80 MMHG | HEIGHT: 61 IN | TEMPERATURE: 98 F | HEART RATE: 93 BPM

## 2025-01-22 DIAGNOSIS — N84.0 POLYP OF CORPUS UTERI: ICD-10-CM

## 2025-01-22 DIAGNOSIS — Z98.890 OTHER SPECIFIED POSTPROCEDURAL STATES: Chronic | ICD-10-CM

## 2025-01-22 DIAGNOSIS — L05.91 PILONIDAL CYST WITHOUT ABSCESS: Chronic | ICD-10-CM

## 2025-01-22 DIAGNOSIS — Z98.82 BREAST IMPLANT STATUS: Chronic | ICD-10-CM

## 2025-01-22 PROCEDURE — 88305 TISSUE EXAM BY PATHOLOGIST: CPT | Mod: 26

## 2025-01-22 DEVICE — MYOSURE TISSUE REMOVAL DEVICE REACH
Type: IMPLANTABLE DEVICE | Status: NON-FUNCTIONAL
Removed: 2025-01-22

## 2025-01-22 RX ORDER — ESTRADIOL 0.07 MG/D
1 PATCH, EXTENDED RELEASE TRANSDERMAL
Refills: 0 | DISCHARGE

## 2025-01-22 RX ORDER — PROGESTERONE 100 MG/1
1 CAPSULE ORAL
Refills: 0 | DISCHARGE

## 2025-01-22 NOTE — ASU DISCHARGE PLAN (ADULT/PEDIATRIC) - NURSING INSTRUCTIONS
Last dose of TYLENOL for pain management was at 08:15 AM. Next dose of TYLENOL may be taken at or after 2: 15 PM   if needed. DO NOT take any additional products containing TYLENOL or ACETAMINOPHEN, such as VICODIN, PERCOCET, NORCO, EXCEDRIN, and any over-the-counter cold medications. DO NOT CONSUME MORE THAN 7214-1430 MG OF TYLENOL (acetaminophen) in a 24-hour period.  Next dose of NSAIDS (ibuprofen, motrin, advil, naproxen, aleve, or aspirin) may be taken at or after 2:15 PM if needed.

## 2025-01-22 NOTE — BRIEF OPERATIVE NOTE - NSICDXBRIEFPROCEDURE_GEN_ALL_CORE_FT
PROCEDURES:  Hysteroscopy, with dilation and curettage of uterus and polypectomy or uterine myomectomy using MyoSure tissue removal system 22-Jan-2025 08:19:43  Dipti Titus  Exam under anesthesia, pelvic 22-Jan-2025 08:19:58  Dipti Titus

## 2025-01-22 NOTE — ASU DISCHARGE PLAN (ADULT/PEDIATRIC) - NS MD DC FALL RISK RISK
For information on Fall & Injury Prevention, visit: [email or web address] OR  [email or web address] OR  https://www.cdc.gov/steadi/patient.html

## 2025-01-22 NOTE — ASU DISCHARGE PLAN (ADULT/PEDIATRIC) - CARE PROVIDER_API CALL
Dipti Titus  Obstetrics and Gynecology  [address], [address]  [place] [address]  Phone: [phone]  Fax: [phone]  Follow Up Time:

## 2025-01-22 NOTE — ASU DISCHARGE PLAN (ADULT/PEDIATRIC) - FINANCIAL ASSISTANCE
[facility] provides services at a reduced cost to those who are determined to be eligible through [facility]’s financial assistance program. Information regarding [facility]’s financial assistance program can be found by going to [email or web address] or by calling [phone].

## 2025-01-28 LAB — SURGICAL PATHOLOGY STUDY: SIGNIFICANT CHANGE UP

## 2025-03-05 ENCOUNTER — APPOINTMENT (OUTPATIENT)
Dept: NEUROLOGY | Facility: CLINIC | Age: 65
End: 2025-03-05
Payer: MEDICARE

## 2025-03-05 ENCOUNTER — NON-APPOINTMENT (OUTPATIENT)
Age: 65
End: 2025-03-05

## 2025-03-05 VITALS
HEART RATE: 73 BPM | OXYGEN SATURATION: 99 % | HEIGHT: 61.5 IN | DIASTOLIC BLOOD PRESSURE: 64 MMHG | WEIGHT: 148 LBS | SYSTOLIC BLOOD PRESSURE: 99 MMHG | TEMPERATURE: 98.2 F | BODY MASS INDEX: 27.58 KG/M2

## 2025-03-05 DIAGNOSIS — G43.709 CHRONIC MIGRAINE W/OUT AURA, NOT INTRACTABLE, W/OUT STATUS MIGRAINOSUS: ICD-10-CM

## 2025-03-05 PROCEDURE — 99215 OFFICE O/P EST HI 40 MIN: CPT | Mod: 25

## 2025-03-05 PROCEDURE — 64615 CHEMODENERV MUSC MIGRAINE: CPT

## 2025-04-07 ENCOUNTER — NON-APPOINTMENT (OUTPATIENT)
Age: 65
End: 2025-04-07

## 2025-04-08 ENCOUNTER — APPOINTMENT (OUTPATIENT)
Dept: ENDOCRINOLOGY | Facility: CLINIC | Age: 65
End: 2025-04-08
Payer: MEDICARE

## 2025-04-08 VITALS
HEIGHT: 61.5 IN | OXYGEN SATURATION: 98 % | WEIGHT: 147 LBS | DIASTOLIC BLOOD PRESSURE: 60 MMHG | HEART RATE: 80 BPM | SYSTOLIC BLOOD PRESSURE: 110 MMHG | BODY MASS INDEX: 27.4 KG/M2

## 2025-04-08 DIAGNOSIS — E04.2 NONTOXIC MULTINODULAR GOITER: ICD-10-CM

## 2025-04-08 DIAGNOSIS — R79.89 OTHER SPECIFIED ABNORMAL FINDINGS OF BLOOD CHEMISTRY: ICD-10-CM

## 2025-04-08 PROCEDURE — G2211 COMPLEX E/M VISIT ADD ON: CPT

## 2025-04-08 PROCEDURE — 99204 OFFICE O/P NEW MOD 45 MIN: CPT

## 2025-04-09 LAB
T4 FREE SERPL-MCNC: 1.4 NG/DL
TSH SERPL-ACNC: 0.48 UIU/ML

## 2025-06-11 ENCOUNTER — APPOINTMENT (OUTPATIENT)
Dept: NEUROLOGY | Facility: CLINIC | Age: 65
End: 2025-06-11

## 2025-06-18 ENCOUNTER — APPOINTMENT (OUTPATIENT)
Dept: NEUROLOGY | Facility: CLINIC | Age: 65
End: 2025-06-18
Payer: MEDICARE

## 2025-06-18 VITALS
SYSTOLIC BLOOD PRESSURE: 114 MMHG | OXYGEN SATURATION: 97 % | DIASTOLIC BLOOD PRESSURE: 63 MMHG | WEIGHT: 147 LBS | TEMPERATURE: 98.2 F | HEART RATE: 68 BPM | BODY MASS INDEX: 27.4 KG/M2 | HEIGHT: 61.5 IN

## 2025-06-18 PROCEDURE — 64615 CHEMODENERV MUSC MIGRAINE: CPT

## 2025-06-18 PROCEDURE — 99215 OFFICE O/P EST HI 40 MIN: CPT | Mod: 25

## 2025-09-02 ENCOUNTER — RX RENEWAL (OUTPATIENT)
Age: 65
End: 2025-09-02

## (undated) DEVICE — PREP DYNA-HEX CHG 4% 4OZ BOTTLE (BACTOSHIELD)

## (undated) DEVICE — PREP TRAY DRY SKIN PREP SCRUB

## (undated) DEVICE — PACK D&C

## (undated) DEVICE — FLUENT FMS PROCEDURE KIT

## (undated) DEVICE — MYOSURE SCOPE SEAL